# Patient Record
Sex: FEMALE | Race: WHITE | NOT HISPANIC OR LATINO | Employment: PART TIME | URBAN - METROPOLITAN AREA
[De-identification: names, ages, dates, MRNs, and addresses within clinical notes are randomized per-mention and may not be internally consistent; named-entity substitution may affect disease eponyms.]

---

## 2017-01-29 ENCOUNTER — HOSPITAL ENCOUNTER (INPATIENT)
Facility: CLINIC | Age: 22
LOS: 5 days | Discharge: HOME | DRG: 885 | End: 2017-02-03
Attending: FAMILY MEDICINE | Admitting: PSYCHIATRY & NEUROLOGY
Payer: COMMERCIAL

## 2017-01-29 DIAGNOSIS — R45.851 SUICIDAL IDEATION: ICD-10-CM

## 2017-01-29 DIAGNOSIS — F33.2 MDD (MAJOR DEPRESSIVE DISORDER), RECURRENT SEVERE, WITHOUT PSYCHOSIS (H): Primary | ICD-10-CM

## 2017-01-29 LAB
AMPHETAMINES UR QL SCN: NORMAL
BARBITURATES UR QL: NORMAL
BENZODIAZ UR QL: NORMAL
CANNABINOIDS UR QL SCN: NORMAL
COCAINE UR QL: NORMAL
ETHANOL UR QL SCN: NORMAL
HCG UR QL: NEGATIVE
OPIATES UR QL SCN: NORMAL

## 2017-01-29 PROCEDURE — 12400001 ZZH R&B MH UMMC

## 2017-01-29 PROCEDURE — 99285 EMERGENCY DEPT VISIT HI MDM: CPT | Mod: Z6 | Performed by: FAMILY MEDICINE

## 2017-01-29 PROCEDURE — 80307 DRUG TEST PRSMV CHEM ANLYZR: CPT | Performed by: FAMILY MEDICINE

## 2017-01-29 PROCEDURE — 81025 URINE PREGNANCY TEST: CPT | Performed by: FAMILY MEDICINE

## 2017-01-29 PROCEDURE — 25000132 ZZH RX MED GY IP 250 OP 250 PS 637: Performed by: PSYCHIATRY & NEUROLOGY

## 2017-01-29 PROCEDURE — 80320 DRUG SCREEN QUANTALCOHOLS: CPT | Performed by: FAMILY MEDICINE

## 2017-01-29 PROCEDURE — 99285 EMERGENCY DEPT VISIT HI MDM: CPT | Mod: 25 | Performed by: FAMILY MEDICINE

## 2017-01-29 PROCEDURE — 90791 PSYCH DIAGNOSTIC EVALUATION: CPT

## 2017-01-29 RX ORDER — HYDROXYZINE HYDROCHLORIDE 25 MG/1
25-50 TABLET, FILM COATED ORAL EVERY 4 HOURS PRN
Status: DISCONTINUED | OUTPATIENT
Start: 2017-01-29 | End: 2017-02-03 | Stop reason: HOSPADM

## 2017-01-29 RX ORDER — BISACODYL 10 MG
10 SUPPOSITORY, RECTAL RECTAL DAILY PRN
Status: DISCONTINUED | OUTPATIENT
Start: 2017-01-29 | End: 2017-02-03 | Stop reason: HOSPADM

## 2017-01-29 RX ORDER — TRAZODONE HYDROCHLORIDE 50 MG/1
50 TABLET, FILM COATED ORAL
Status: DISCONTINUED | OUTPATIENT
Start: 2017-01-29 | End: 2017-02-03 | Stop reason: HOSPADM

## 2017-01-29 RX ORDER — OLANZAPINE 5 MG/1
5 TABLET ORAL
Status: DISCONTINUED | OUTPATIENT
Start: 2017-01-29 | End: 2017-02-03 | Stop reason: HOSPADM

## 2017-01-29 RX ORDER — OLANZAPINE 10 MG/2ML
5 INJECTION, POWDER, FOR SOLUTION INTRAMUSCULAR
Status: DISCONTINUED | OUTPATIENT
Start: 2017-01-29 | End: 2017-02-03 | Stop reason: HOSPADM

## 2017-01-29 RX ORDER — ALUMINA, MAGNESIA, AND SIMETHICONE 2400; 2400; 240 MG/30ML; MG/30ML; MG/30ML
30 SUSPENSION ORAL EVERY 4 HOURS PRN
Status: DISCONTINUED | OUTPATIENT
Start: 2017-01-29 | End: 2017-02-03 | Stop reason: HOSPADM

## 2017-01-29 RX ORDER — BIOTIN 10 MG
1 TABLET ORAL EVERY MORNING
COMMUNITY
End: 2022-04-11

## 2017-01-29 RX ORDER — ACETAMINOPHEN 325 MG/1
650 TABLET ORAL EVERY 4 HOURS PRN
Status: DISCONTINUED | OUTPATIENT
Start: 2017-01-29 | End: 2017-02-03 | Stop reason: HOSPADM

## 2017-01-29 RX ADMIN — TRAZODONE HYDROCHLORIDE 50 MG: 50 TABLET ORAL at 22:41

## 2017-01-29 NOTE — ED PROVIDER NOTES
History     Chief Complaint   Patient presents with     Depression     pt has long struggled with anxiety and depression, has not sought professional help, has been calling help lines only. pt now having frequent thoughts of suicide but has not made any attempts     Suicidal     HPI  Hana Naegele is a 21 year old female who presents with depression, anxiety, isolating herself, suicidal thoughts.  Patient describes a history of mental health disorder dating back to her aly year in high  school.  She reports treatment with fluoxetine that she found helpful.  There was some question as to whether or not the patient may not be on the autism spectrum but she does not carry that diagnosis.  Due to some very complicated family dynamics she stopped all mental health treatment.  She has been supported financially by her biological father and stepmother.  She has attended Art school, and now is at [employer] due to the previous school closing.  She reports she no longer wants to attend at school, is depressed, anxious, and paranoid and cannot leave her apartment.  She states she has been isolated in the apartment since January 9.  She has frequent suicidal thoughts including thoughts about jumping off a bridge.  No previous suicide attempts but admits that last September she went [place] the [place] and contemplated jumping.  She is somewhat estranged from her biological father, alleging that he would like to monitor all of her emails, texts, school reports.  She feels that he would like more control than is normal for a parent over a 21-year-old adult.  Until recently, she had very little contact with her biological mother and stepfather for the last year and a half but has been reaching out to them because the other side of her family does not support mental health treatment.  She reports that today she called her biological mother and stepfather and told him that she was desperate and needed help.    I have  reviewed the Medications, Allergies, Past Medical and Surgical History, and Social History in the Epic system.    Review of Systems  All other systems reviewed and were negative    Physical Exam   BP: 129/80 mmHg  Pulse: 93  Temp: 98  F (36.7  C)  Resp: 14  Weight: 72.122 kg (159 lb)  SpO2: 98 %  Physical Exam   Constitutional: No distress.   HENT:   Head: Atraumatic.   Mouth/Throat: Oropharynx is clear and moist. No oropharyngeal exudate.   Eyes: Pupils are equal, round, and reactive to light. No scleral icterus.   Cardiovascular: Normal heart sounds and intact distal pulses.    Pulmonary/Chest: Breath sounds normal. No respiratory distress.   Abdominal: Soft. Bowel sounds are normal. There is no tenderness.   Musculoskeletal: She exhibits no edema or tenderness.   Skin: Skin is warm. No rash noted. She is not diaphoretic.   Psychiatric: Her speech is normal and behavior is normal. Judgment normal. Her mood appears anxious. Cognition and memory are normal. She exhibits a depressed mood. She expresses suicidal ideation. She expresses suicidal plans.       ED Course     Procedures             Critical Care time:  none               Labs Ordered and Resulted from Time of ED Arrival Up to the Time of Departure from the ED - No data to display    Assessments & Plan (with Medical Decision Making)   Patient with a history of depression, anxiety, possible features consistent with autism spectrum disorder but no previous diagnosis.  She presents now with depressed mood, severe anxiety, isolating herself, suicidal thoughts with specific consideration of jumping from a bridge.  Patient was also seen by the [facility] .  Please see their extensive associated note for this patient on this visit.  This patient has a very complicated psychosocial support system, and appears currently to be a somewhat high risk for suicide.  We would like to admit for mental health stabilization, and further assessment.  The patient agrees to  voluntary admission.  No medical or substance abuse issues.    I have reviewed the nursing notes.    I have reviewed the findings, diagnosis, plan and need for follow up with the patient.    New Prescriptions    No medications on file       Final diagnoses:   Depression with anxiety   Suicidal ideation       1/29/2017   [facility], EMERGENCY DEPARTMENT      Iglesia Arauz MD  01/29/17 7985

## 2017-01-29 NOTE — PHARMACY-ADMISSION MEDICATION HISTORY
Admission Medication History status for the 1/29/2017 admission is complete.  See EPIC admission navigator for Prior to Admission medications.    Medication history sources:  Patient    Medication history source reliability: Good; patient knew her current medications    Medication adherence:  Patient is currently only taking a multivitamin. She reports she had been taking medication 1.5 years ago, but stopped taking on her own.      Changes made to PTA medication list (reason)  Added:  - Multivitamin 1 chew tab po daily per patient  Deleted: None  Changed: None    Additional medication history information (including reliability of information, actions taken by pharmacist): None    Time spent in this activity: 10 minutes    Medication history completed by: Ann Givens, PharmD    Prior to Admission medications    Medication Sig Last Dose Taking? Auth Provider   Multiple Vitamins-Minerals (MULTIVITAMIN ADULT) CHEW Take 1 chew tab by mouth daily Past Week Yes Unknown, Entered By History

## 2017-01-29 NOTE — IP AVS SNAPSHOT
MRN:[id number]                      After Visit Summary   1/29/2017    Hana Naegele    MRN: [id number]           Patient Information     Date Of Birth          1995        About your hospital stay     You were admitted on:  January 29, 2017 You last received care in the:  [facility]    You were discharged on:  February 3, 2017       Who to Call     For medical emergencies, please call 911.  For non-urgent questions about your medical care, please call your primary care provider or clinic, None          Attending Provider     Provider    Iglesia Arauz MD Pavey, Leonid Rucker MD       Primary Care Provider    Physician No Ref-Primary       No address on file        Your next 10 appointments already scheduled     Feb 07, 2017  2:00 PM   Evaluation with [facility] ([facility])    [address] [id number] [phone]              Further instructions from your care team       Behavioral Discharge Planning and Instructions      Summary: You were admitted on 1/29/2017 to [address] for Depression and Suicidal Ideations. You were treated by Dr. Leonid De La Cruz MD and discharged on 02/03/2017.     Disposition: Discharged to home    Main Diagnosis:  Major Depressive D/O    Health Care Follow-up Appointments:   Medication Management: No appointments were set up as you are looking into various providers and report that you will schedule the appointment on your own.    Therapy Appointment/Referrals:   No Appt has been set up yet as you report that you will be applying for MA (MNSURE when you get back home)+    [facility]  [address] [place] [address]  Phone: [phone]    Day Treatment Appointment:   Date: 02/10/17 (Initial Assessment)  Time: 2:00 PM      Provider: [facility]  "  Address: [address] [place] [address]  Phone:[phone]      Attend all scheduled appointments with your outpatient providers. Call at least 24 hours in advance if you need to reschedule an appointment to ensure continued access to your outpatient providers.   Major Treatments, Procedures and Findings: You were provided with: a psychiatric assessment, assessed for medical stability, medication evaluation and/or management, group therapy, art therapy, milieu management, medical interventions, skills/OT groups and family discharge conference.    Symptoms to Report: If you experience any of the following symptoms please report them right away to your provider or to family/friends; increased confusion, losing more sleep, mood getting worse or thoughts of suicide.    Early warning signs can include: Early warning signs that could signal a potential relapse could include but not limited to the following; increased depression or anxiety sleep disturbances increased thoughts or behaviors of suicide or self-harm .    Safety and Wellness: Take all medicines as directed. Make no changes unless your doctor suggests them.      Follow treatment recommendations. Refrain from alcohol and non-prescribed drugs.  If there is a concern for safety, call 911..    Resources:    Crisis Intervention: 751.991.4335 or 604-043-2998 (TTY: 582.992.7265).  Call anytime for help.  National [place] on Mental Illness (www.mn.lynn.org): 817.574.9122 or 987-219-0632.  National Suicide Prevention Line (www.mentalhealthmn.org): 811-860-FVSR (2581)  [facility] Crisis (COPE) Response - Adult 948 136-5597  Text 4 Life: txt \"LIFE\" to 01737 for immediate support and crisis intervention  Crisis text line: Text \"START\" to 152-057. Free, confidential, 24/7.  Crisis Intervention: 737.467.7618 or 521-631-5192. Call anytime for help.     The treatment team has appreciated the opportunity to work with you. Dany ,  please take " "care and make your recovery a daily recovery. If you have any questions or concerns our unit number is [phone].  You will be receiving a follow-up phone call within the next three days from a representative from behavioral health.  You have identified the best phone number to reach you as [phone].          Pending Results     No orders found from 2017 to 2017.            Admission Information        Provider Department Dept Phone    2017 Leonid De La Cruz MD [facility]  [phone]      Your Vitals Were     Blood Pressure Pulse Temperature Respirations Weight Pulse Oximetry    115/72 mmHg 80 98.3  F (36.8  C) (Oral) 16 70.398 kg (155 lb 3.2 oz) 96%      MyChart Information     [email or web address] lets you send messages to your doctor, view your test results, renew your prescriptions, schedule appointments and more. To sign up, go to [email or web address] . Click on \"Log in\" on the left side of the screen, which will take you to the Welcome page. Then click on \"Sign up Now\" on the right side of the page.     You will be asked to enter the access code listed below, as well as some personal information. Please follow the directions to create your username and password.     Your access code is: [email or web address]  Expires: 2017  8:44 AM     Your access code will  in 90 days. If you need help or a new code, please call your [facility] clinic or [phone].        Care EveryWhere ID     This is your Care EveryWhere ID. This could be used by other organizations to access your [facility] medical records  [id number]           Review of your medicines      START taking        Dose / Directions    FLUoxetine 10 MG capsule   Commonly known as:  PROzac   Used for:  MDD (major depressive disorder), recurrent severe, without psychosis (H)        Dose:  10 mg   Take 1 capsule (10 mg) by mouth daily   Quantity:  30 capsule   Refills:  1         CONTINUE these medicines which have NOT CHANGED        " Dose / Directions    MULTIVITAMIN ADULT Chew        Dose:  1 chew tab   Take 1 chew tab by mouth daily   Refills:  0            Where to get your medicines      These medications were sent to [facility] [place], [place] - [address]  [address] [facility], [facility] [address]     Phone:  [phone]    - FLUoxetine 10 MG capsule             Protect others around you: Learn how to safely use, store and throw away your medicines at www.disposemymeds.org.             Medication List: This is a list of all your medications and when to take them. Check marks below indicate your daily home schedule. Keep this list as a reference.      Medications           Morning Afternoon Evening Bedtime As Needed    FLUoxetine 10 MG capsule   Commonly known as:  PROzac   Take 1 capsule (10 mg) by mouth daily   Last time this was given:  10 mg on 2/3/2017  8:21 AM                                MULTIVITAMIN ADULT Chew   Take 1 chew tab by mouth daily

## 2017-01-29 NOTE — IP AVS SNAPSHOT
[facility]    [facility] [address]    [address] [facility] [address]    Phone:  [phone]                                       After Visit Summary   1/29/2017    Hana Naegele    MRN: [id number]           After Visit Summary Signature Page     I have received my discharge instructions, and my questions have been answered. I have discussed any challenges I see with this plan with the nurse or doctor.    ..........................................................................................................................................  Patient/Patient Representative Signature      ..........................................................................................................................................  Patient Representative Print Name and Relationship to Patient    ..................................................               ................................................  Date                                            Time    ..........................................................................................................................................  Reviewed by Signature/Title    ...................................................              ..............................................  Date                                                            Time

## 2017-01-30 PROBLEM — F41.8 DEPRESSION WITH ANXIETY: Status: ACTIVE | Noted: 2017-01-30

## 2017-01-30 PROBLEM — F33.2 MDD (MAJOR DEPRESSIVE DISORDER), RECURRENT SEVERE, WITHOUT PSYCHOSIS (H): Status: ACTIVE | Noted: 2017-01-30

## 2017-01-30 PROBLEM — R45.851 SUICIDAL IDEATION: Status: ACTIVE | Noted: 2017-01-30

## 2017-01-30 LAB
ANION GAP SERPL CALCULATED.3IONS-SCNC: 8 MMOL/L (ref 3–14)
BASOPHILS # BLD AUTO: 0 10E9/L (ref 0–0.2)
BASOPHILS NFR BLD AUTO: 0.3 %
BUN SERPL-MCNC: 9 MG/DL (ref 7–30)
CALCIUM SERPL-MCNC: 8.8 MG/DL (ref 8.5–10.1)
CHLORIDE SERPL-SCNC: 109 MMOL/L (ref 94–109)
CHOLEST SERPL-MCNC: 113 MG/DL
CO2 SERPL-SCNC: 25 MMOL/L (ref 20–32)
CREAT SERPL-MCNC: 0.69 MG/DL (ref 0.52–1.04)
DEPRECATED CALCIDIOL+CALCIFEROL SERPL-MC: 20 UG/L (ref 20–75)
DIFFERENTIAL METHOD BLD: NORMAL
EOSINOPHIL # BLD AUTO: 0.3 10E9/L (ref 0–0.7)
EOSINOPHIL NFR BLD AUTO: 4.5 %
ERYTHROCYTE [DISTWIDTH] IN BLOOD BY AUTOMATED COUNT: 13.3 % (ref 10–15)
GFR SERPL CREATININE-BSD FRML MDRD: NORMAL ML/MIN/1.7M2
GLUCOSE SERPL-MCNC: 79 MG/DL (ref 70–99)
HCT VFR BLD AUTO: 39 % (ref 35–47)
HDLC SERPL-MCNC: 33 MG/DL
HGB BLD-MCNC: 14.2 G/DL (ref 11.7–15.7)
IMM GRANULOCYTES # BLD: 0 10E9/L (ref 0–0.4)
IMM GRANULOCYTES NFR BLD: 0.3 %
LDLC SERPL CALC-MCNC: 66 MG/DL
LYMPHOCYTES # BLD AUTO: 3 10E9/L (ref 0.8–5.3)
LYMPHOCYTES NFR BLD AUTO: 42.7 %
MCH RBC QN AUTO: 32.9 PG (ref 26.5–33)
MCHC RBC AUTO-ENTMCNC: 36.4 G/DL (ref 31.5–36.5)
MCV RBC AUTO: 90 FL (ref 78–100)
MONOCYTES # BLD AUTO: 0.5 10E9/L (ref 0–1.3)
MONOCYTES NFR BLD AUTO: 6.8 %
NEUTROPHILS # BLD AUTO: 3.2 10E9/L (ref 1.6–8.3)
NEUTROPHILS NFR BLD AUTO: 45.4 %
NONHDLC SERPL-MCNC: 80 MG/DL
NRBC # BLD AUTO: 0 10*3/UL
NRBC BLD AUTO-RTO: 0 /100
PLATELET # BLD AUTO: 204 10E9/L (ref 150–450)
POTASSIUM SERPL-SCNC: 3.6 MMOL/L (ref 3.4–5.3)
RBC # BLD AUTO: 4.32 10E12/L (ref 3.8–5.2)
SODIUM SERPL-SCNC: 142 MMOL/L (ref 133–144)
TRIGL SERPL-MCNC: 69 MG/DL
TSH SERPL DL<=0.005 MIU/L-ACNC: 1.77 MU/L (ref 0.4–4)
WBC # BLD AUTO: 6.9 10E9/L (ref 4–11)

## 2017-01-30 PROCEDURE — 25000132 ZZH RX MED GY IP 250 OP 250 PS 637: Performed by: PSYCHIATRY & NEUROLOGY

## 2017-01-30 PROCEDURE — 36415 COLL VENOUS BLD VENIPUNCTURE: CPT | Performed by: PSYCHIATRY & NEUROLOGY

## 2017-01-30 PROCEDURE — 12400001 ZZH R&B MH UMMC

## 2017-01-30 PROCEDURE — H2032 ACTIVITY THERAPY, PER 15 MIN: HCPCS

## 2017-01-30 PROCEDURE — 99222 1ST HOSP IP/OBS MODERATE 55: CPT | Mod: AI | Performed by: PSYCHIATRY & NEUROLOGY

## 2017-01-30 PROCEDURE — 82306 VITAMIN D 25 HYDROXY: CPT | Performed by: PSYCHIATRY & NEUROLOGY

## 2017-01-30 PROCEDURE — 84443 ASSAY THYROID STIM HORMONE: CPT | Performed by: PSYCHIATRY & NEUROLOGY

## 2017-01-30 PROCEDURE — 80048 BASIC METABOLIC PNL TOTAL CA: CPT | Performed by: PSYCHIATRY & NEUROLOGY

## 2017-01-30 PROCEDURE — 80061 LIPID PANEL: CPT | Performed by: PSYCHIATRY & NEUROLOGY

## 2017-01-30 PROCEDURE — 85025 COMPLETE CBC W/AUTO DIFF WBC: CPT | Performed by: PSYCHIATRY & NEUROLOGY

## 2017-01-30 RX ADMIN — HYDROXYZINE HYDROCHLORIDE 25 MG: 25 TABLET ORAL at 19:47

## 2017-01-30 ASSESSMENT — ACTIVITIES OF DAILY LIVING (ADL)
GROOMING: INDEPENDENT
GROOMING: INDEPENDENT
LAUNDRY: WITH SUPERVISION
ORAL_HYGIENE: INDEPENDENT
ORAL_HYGIENE: INDEPENDENT
DRESS: INDEPENDENT
DRESS: INDEPENDENT
LAUNDRY: UNABLE TO COMPLETE

## 2017-01-30 NOTE — PROGRESS NOTES
01/30/17 1600   Art Therapy   Type of Intervention structured groups   Response participates with encouragement   Hours 3   Groups for Art Therapy included 1. Group intervention called the carosel about team work and peer support, then there was a free drawing hour. After a music group, haider was tired and didn't want to participate in meditation today. For the few participants there were, they listened to a video from LEANDRA Avilez about Self Compassion. This patient was cooperative and pleasant. Pt was helpful to a male peer and helped him design a coloring sheet for his project, he liked her drawing style. She is pursuing a degree in Signature.

## 2017-01-30 NOTE — PROGRESS NOTES
Case Management Note  1/30/2017    CTC met with patient to initiate discharge planning and to do initial psych-social assessment. Initial psych-social note to follow.

## 2017-01-30 NOTE — H&P
"DATE OF ADMISSION:  01/29/2017.       DATE OF SERVICE:  01/30/2017.       CHIEF COMPLAINT:  \"I have been feeling suicidal.\"      HISTORY OF PRESENT ILLNESS:  Hana Naegele is a 21-year-old woman who reports that she has been increasingly depressed over the last year.  She has had suicidal thoughts and does feel she can keep herself safe.  She indicates that her depression really started to worsen when she found out that her school was closing.  She was going to the [employer] in [place].  She left because her choices were either to stay on and not be covered under any sort of federal loan thing or go to the [employer], so she elected to go there instead.  She said that this caused her to lose contact with a number of friends at the school.  In addition, she had some friends who had planned to go to that school and she was going to live with them so she lost her roommates.  She has also struggled with finding some roommates as she has moved around a few times.  She indicates that her grandparents on her father's side pay for her tuition but she feels that there is a lot strings with this.  They are very inconsistent on whether or not they want her to work or focus on school, and she feels that they are overly intrusive in the way they monitor how she is doing.  She has been told since November of last year that she needs to give her user name and password so they can log in and see all her verifications of classroom attendance.  She is frustrated about this because that means they would also have access to emails, any communication with professors and basically a lot of potentially personal things.  They are unwilling to pay for tuition on her behalf without these things.  In addition, they have been trying to get her a new cell phone and she feels that they want to try and track her with her cell phone.  She says she does have some more paranoid thoughts at times but recognizes " they are paranoid and does not think that they are real.  These worsen with her depression.  Apparently in September she had contemplated jumping off of the [employer] [place] in a suicide attempt after she had a fight with some friends; however, she changed her mind after recalling a previous conversation that day with another friend.  She has never had an actual suicide attempt.  She does not feel suicidal today, but was having some suicidal thoughts last night.  She does have a history of being on Prozac; however, she is not interested on being on a medication at this time as she does not want to become dependent on them.  She is interested in seeing a therapist outside the hospital.  The patient does hint at some other issues that she says she has some gender dysphoria.  She talks about some interests of hers in the past and intentionally referred to that person in gender neutral terms multiple times.  She is resistant to discussing these issues at this time.  She reports that her sleep is good.  Her appetite is good.  She says her daytime energy and motivation are reasonable.  She listens to music.  She likes to draw.  She also plays video games throughout the day.  She says she does not leave the house, however.       PAST PSYCHIATRIC HISTORY:  The patient denies any past inpatient hospitalizations.  She does not currently have an outpatient psychiatrist or therapist.  Suicide history noted in HPI.      PAST MEDICAL HISTORY:  The patient denies any chronic or acute medical issues.      SUBSTANCE HISTORY:  The patient denies any tobacco, alcohol or illicit drug use.      PHYSICAL REVIEW OF SYSTEMS:  The patient denies problems on 10-point review of systems except as noted in HPI.      FAMILY HISTORY:  The patient denies any family suicide.  She said that her mother has problems with anxiety and she wonders if mother had problems with prescription drugs as when she and her sister would get any sort of pain  prescription or something, her mother would fill them and take half of them even if she indicated that her pain was not bad enough to fill in the first place.      SOCIAL HISTORY:  The patient currently lives with a roommate who is attending college at the [employer].  She is attending college at [employer]; however, she has not attended any days this semester.  This semester started on 01/09.  Other relevant social history as noted in HPI.      ALLERGIES:  The patient is allergic to penicillin.      PRIOR TO ADMISSION MEDICATIONS:  None.      LABORATORY DATA:  Basic metabolic panel within normal limits.  Lipid panel notable for low HDL of 33, otherwise within normal limits.  TSH 1.77.  Urine pregnancy was negative.  CBC with differential is within normal limits.  Urine toxicology is negative for all drugs of abuse.      VITAL SIGNS:  Temperature is 98.4, pulse 79, respiratory rate is 16, blood pressure is 115/72, oxygen saturations 96% on room air.      PHYSICAL EXAMINATION:  I have reviewed physical exam as documented by emergency room physician, Iglesia Arauz, dated 01/29/2017.  I have no additional findings at this time.      MENTAL STATUS EXAMINATION:  The patient is alert.  She is oriented to person, place and date.  She is somewhat unkempt but she is casually dressed.  She has good eye contact.  She is cooperative throughout the interview.  Speech is normal in rate and volume.  Language is intact for word usage and sentence structure.  Mood is euthymic, somewhat down.  Affect is congruent to mood.  She is somewhat guarded.  Thought process is linear for the most part.  She tells very detailed stories, but does stay on track.  Associations are intact.  Thought content is currently negative for suicidal thoughts; however, she was having suicidal thoughts last night.  She denies any current hallucinations.  Does not appear to be responding to internal stimuli.  She  has no psychomotor agitation or retardation.  Muscle strength and tone and gait and station are within normal limits.  Recent and remote memory are intact.  Fund of knowledge is adequate.  Attention and concentration are intact.  Insight is fair, judgment is fair.      DIAGNOSIS:   1.  Major depressive disorder, recurrent, severe without psychotic features.      PLAN:   1.  The patient is not agreeable to starting medication at this time.   2.  CTC to work with patient on referral for outpatient therapy.   3.  Legal:  The patient is currently here voluntarily.   4.  Disposition:  Anticipate discharge tomorrow or Wednesday if she remains free from suicidal thinking.  Will stay in the hospital until we have outpatient followup established.         JOSEPHINE ZUNIGA MD             D: 2017 15:06   T: 2017 16:48   MT: SHER      Name:     NAEGELE, HANA   MRN:      [id number]        Account:      [id number]   :      1995           Admitted:     [id number]      Document: [id number]

## 2017-01-30 NOTE — PROGRESS NOTES
Patient is admitted to station 4A from the [facility].  Patient is searched and given a tour of the unit.  Patient spoke with mom.  A meal tray is ordered.  After the patient has eaten, the patient and writer will sit down and complete the admission.

## 2017-01-30 NOTE — PROGRESS NOTES
"Initial Psychosocial Assessment    Information for assessment was obtained from: I have reviewed the chart and interviewed the patient.      NAFISA's: The patient has signed NAFISA's for her mother \"Ana\".     Presenting Problem/Precipitory Event: Treatment due to own awareness of need for help. Per EMR:  Pt \"has been increasingly depressed over the last year.  She has had suicidal thoughts and does feel she can keep herself safe.  She indicates that her depression really started to worsen when she found out that her school was closing.  She was going to the [email or web address] in [place].  She left because her choices were either to stay on and not be covered under any sort of federal loan thing or go to the [place] [employer], so she elected to go there instead.\"  Legal Status: The patient admitted under voluntary status.  History of Mental Health and Chemical Dependency:  Diagnosis: Per EMR; Major Depressive D/O.  Current symptoms: Pt reports having the following  symptomology; isolating, lack of energy, poor sleep, loss of interest, low mood, trouble concentrating and racing thoughts.  Previous hospitalizations: No.  MH treatment history: Yes (explain) - Reports seeing a therapist as an adolescent..    Commitments (Current or Previous): No.    Hx of suicidal attempts: No.  SIB's: No.  Suicidal thoughts and plan: Yes (explain) - Reports she had SI's and had a plan of jumping off a bridge pre hospital admission. Pt denies having any current SI's or plans.   Homicidal Ideation and History of Aggression: No.     Substance Use/Abuse History:    At time of admission, the patient s drug screen was negative for all substances tested.    History of Chemical Dependency: No.  Family Description (Constellation, Family Psychiatric History):    Pt reports her childhood was \"very difficult\" and that they felt supported 27% of the time growing up.   How many siblings? 1-sister.  Birth order: first  Pt reports " "that her father was adopted by pt's grandparents as a kid. Pt reports that parents split when she was younger than 7 years old and stayed with her mother since. Pt reports that her mother got a lot of money for child support but pt still had to live in the \"3rd garage stall\" due to there not being an available bedroom. Pt reports that she didn't talk with her dad for several years and that he comes from a \"very, very wealthy family\". Pt reports that she came out at the age of 13 that she was a boy but due to family pressures has been unable to pursue a gender change.   Are you close to any of your family members/natural supports? Yes, pt reports only being close to her mother but nobody else in the family.  Current relationship (s): Single, in no serious relationship.  Children: No children.    Family MH and/or CD History: Yes (Explain) - Family MH History: Mother takes anti-axiety medications.. Family CD History: Mother used to fill and take both pt's sister and the pt's prescriptions when younger. Pt doesn't think that her mother has any current CD issues...   Significant Life Events (Illness, Abuse, Trauma, Death):  Yes (explain) - Pt reports her grandmothers severe illness and that her grandmother is looking into assisted suicide. Pt also reports that her struggles with family regarding her gender identity has also been traumatic for her..    Medical History (Per EMR):   No past medical history on file.  Note: See medical records for complete medical history.   Living Situation: Pt reports that they; have stable housing and has some concerns about if her grandparents will not continue to pay for her apartment due to not being an active student.  Educational Background:   Highest grade of school completed: Pt reports that they have some college (1+ years), but have no degree.    Occupational History:   Pt reports that they are unemployed mainly due to being a student.  Financial Status: Strained  Sources of " Income (i.e. social security, alimony, [place], employed or other): Allowance from grandparents.  Transportation (i.e. drives, public transport, medical ride): Takes public transportation or rides from others due to only having her permit.  Type of insurance: Payor: BCBS / Plan: BCBS OUT OF STATE / Product Type: [employer] /  [id number]    Legal Issues:  No.  Ethnic/Cultural Issues:  Yes (explain) - Pt reports her gender identity is a cultural issue for her that she is sturggling with and would like to go ahead with the gender re-assignment process..  Spiritual Orientation: Pt reports they are No affiliations.   Service History:  No.  Social Functioning (organization, interests):  Pt reports she enjoys, art and marine biology. Pt reports also having the following; Minimal support network, Tendency to isolate from others, Relationship conflict with family members or friends due to her mental health concerns, No history of legal charges, Unemployed, Decreased performance at work or school in part due to her mental health concerns and Financial stressors.  Current Treatment Providers:  Psychiatrist: None currently but in the past had one.  Therapist: None  Primary Care Provider: None    Social Service Assessment/Plan:  Patient has been admitted to the psychiatric unit for stabilization. Patient will be seen and assessed by psychiatric doctor. Medication changes will be reviewed and monitored. Groups will be offered to assist patient with increasing knowledge, strategies, and copping techniques to help manage mental health. CTC will meet with patient to provide individualized mental health resources and support throughout patient s hospitalization. CTC will assist with developing a Care Plan as well as aftercare appointments. CTC recommended pt [facility] at [facility] the [facility] regarding therapy or to look at [facility] for both therapist and possible DBT programming.  Pt reports wanting to obtain and appointment through the [facility] at this time and declines wanting a medication provider at this time.

## 2017-01-30 NOTE — PROGRESS NOTES
01/29/17 1808   Patient Belongings   Did you bring any home meds/supplements to the hospital?  No   Patient Belongings cell phone/electronics;clothing;keys;wallet;shoes;purse;money (see comment)   Disposition of Belongings in patient's bin   Belongings Search Yes   Clothing Search Yes   Second Staff Abby     In patient's bin:     Cellphone, , keys, sunglasses, black jump drive, purse, wallet, jacket, shoes    Envelope sent to security: #[id number] (1 student id, [place] card, [place], 2 mastercards, $21.00)    Additional items brought by mother:   1 red fleece;  1 pair blue jeans;   1 green tshirt;  1 black tshirt;  1 gray sweatshirt;  1 pair black pants;  1 gray tshirt;  1 striped tank top;  1 pair slippers;   4 pairs underwear.        ADMISSION:  I am responsible for any personal items that are not sent to the safe or pharmacy. [facility] is not responsible for loss, theft or damage of any property in my possession.    Patient Signature _____________________ Date/Time _____________________    Staff Signature _______________________ Date/Time _____________________    2nd Staff person, if patient is unable/unwilling to sign  ___________________________________ Date/Time _____________________    DISCHARGE:  My personal items have been returned to me.   Patient Signature _____________________ Date/Time _____________________
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
## 2017-01-30 NOTE — PROGRESS NOTES
Pt attended Art Therapy, but not focus group. Wants outpatient treatment. Admits/accepts condition. Pt pleasant upon approach, social in milieu. Pt denies SI/SIB. Says her depression has been worse than it was today, has been working to become more comfortable here. Says her room is difficult to be in.     01/30/17 1400   Behavioral Health   Thinking intact   Orientation person: oriented;place: oriented;date: oriented;time: oriented   Memory baseline memory   Insight admits / accepts   Judgement impaired   Eye Contact at examiner   Affect blunted, flat   Mood depressed;mood is calm   Physical Appearance/Attire attire appropriate to age and situation   Hygiene well groomed   Suicidality other (see comments)  (denies)   Self Injury other (see comment)  (denies)   Activity isolative   Speech coherent;clear   Medication Sensitivity no stated side effects;no observed side effects   Psychomotor / Gait balanced;steady   Psycho Education   Type of Intervention 1:1 intervention   Response participates, initiates socially appropriate   Hours 0.5   Treatment Detail Triggers   Activities of Daily Living   Hygiene/Grooming independent   Oral Hygiene independent   Dress independent   Laundry unable to complete   Room Organization independent   Activity   Activity Level of Assistance independent   Behavioral Health Interventions   Depression maintain safety precautions;maintain safe secure environment;assist with developing and utilizing healthy coping strategies;build upon strengths   Social and Therapeutic Interventions (Depression) encourage socialization with peers;encourage effective boundaries with peers;encourage participation in therapeutic groups and milieu activities

## 2017-01-31 PROCEDURE — 90853 GROUP PSYCHOTHERAPY: CPT

## 2017-01-31 PROCEDURE — 12400001 ZZH R&B MH UMMC

## 2017-01-31 PROCEDURE — 99232 SBSQ HOSP IP/OBS MODERATE 35: CPT | Performed by: PSYCHIATRY & NEUROLOGY

## 2017-01-31 PROCEDURE — 97150 GROUP THERAPEUTIC PROCEDURES: CPT | Mod: GO

## 2017-01-31 ASSESSMENT — ACTIVITIES OF DAILY LIVING (ADL)
ORAL_HYGIENE: INDEPENDENT
ORAL_HYGIENE: INDEPENDENT
GROOMING: INDEPENDENT
DRESS: STREET CLOTHES
DRESS: INDEPENDENT
GROOMING: INDEPENDENT

## 2017-01-31 NOTE — PROGRESS NOTES
"Writer receives phone call from pt's biological mother Ana, who visit pt last evening. Caller states pt was extremely anxious and paranoid, stating \"my father is going to kill me and be very upset that I am here and I am nervous he and his wife, my step mother, will just show up or something\". Patient is still considering medications.    Mom called with concern for daughter. She would like patient to start taking prozac because it has helped her in the past. Her sister takes it and it helps her too. Caller also concerned for Dad & step mother calling and imposing themselves as her just to obtain information. Mom and step mom have done that in past to obtain information on other children. Mom wishes to protect her daughter from having to confront her father at this time. MotherAna can be reached at [phone] Caller given reassurance of patients privacy     Discussed the option of being restricted from directory with pt. Patient would like others to know she is in hospital, and expects to receive calls from her friends. Pt would like to only receive incoming calls from mother Ana. Patient, Mother and Nursing unit have established a code word to protect patient and ensure identity of callers that might be calling pt.  Patient also states a greater desire to begin an antidepressant. She was encouraged to speak to her provider about this today. Positive reinforcement given for her ability to set boundaries and make decision to begin antidepressant.      "

## 2017-01-31 NOTE — PLAN OF CARE
Problem: General Plan of Care (Inpatient Behavioral)  Goal: Team Discussion  Team Plan:   Outcome: No Change  Behavioral Team Discussion: (1/30/2017)    Continued Stay Criteria/Rationale: Patient admitted for Depression and Suicidal Ideations.  Plan: Psychiatric Assessment. Medication Management. Therapeutic Mileu. Individual and group support.  Participants: 4A Provider: Dr. Leonid De La Cruz MD; 4A RN's: Zandra Cifuentes, RN and Marivel Han, RN; 4A CTC's: Alex Jarquin (CTC), Raiza Matias (CTC), Seb Sinha (Art Therapist) and KELVIN Doss (Clinical ).  Summary/Recommendation: Patient admitted due to worsening symptoms of Depression and Suicidal Ideations. Providers will continue to assess today and finalize discharge plan.  Medical/Physical: Deferred (see medical notes).  Progress: No change.

## 2017-01-31 NOTE — PROGRESS NOTES
Pt was present and social in the milieu. Pt attended all groups today and was an active member. Pt had a blunt affect but despite this was social with others. Pt reported having anxiety and depression today both at 5/10. Pt reported having trouble concentrating. Pt reported having auditory hallucinations but seemed to have worked it out to be a product of some racing thoughts. Pt did not seemed concerned about the hallucinations.

## 2017-01-31 NOTE — PROGRESS NOTES
01/31/17 1600   Occupational Therapy   Type of Intervention structured groups   Response Participates   Hours 2.5   Pt. Attended 2 of 3 scheduled OT sessions today.   Observations: pt participated in discussion and group activity with a positive affect and pleasant attitude.   Group Description:   Pt attended and participated in a structured occupational therapy group session where intervention focused on creating a visual cue card for managing time with activities available on the Unit. Pts identified activities as a group and created a group poster with activities that are social vs. individual and were educated on Unit resources.   Pt participated in OT clinic, working on completing a self-initiated project facilitated by OT and graded to appropriate functional performance.

## 2017-01-31 NOTE — PROGRESS NOTES
Writer and patient mother spoke prior to visit per mothers request.  Patient mother asked how she could assist the patient as the patient was focused on her fear and anxiety that she experiences with thoughts of talking to her father and grandfather regarding her current medical and school status. Parent is encouraged to speak with  and encourage patient to focus on herself and take one day at a time.

## 2017-01-31 NOTE — PROGRESS NOTES
[facility] [place]   Psychiatric Progress Note        Interim History:   The patient's care was discussed with the treatment team during the daily team meeting and/or staff's chart notes were reviewed.  Staff report patient has been attending groups and socializing. She apparently indicated she will consider medication.    When I met with the patient, she indicated that she was not interested in medications. She did discuss her gender issues in more detail. She states that she has been asking to be called by a male name since she was 13. She states that she came out as a lesbian and later as transgender officially in high school. She states however that she is attracted to both women and men at times. She feels that a therapist experienced in these issues would be helpful as she believes her family has been unwilling to support her in the way that she needs. Patient indicates that she spoke with her mother, who was supportive yesterday. She still hasn't called to talk with her father about stopping school for the semester.         Medications:           Allergies:     Allergies   Allergen Reactions     Penicillins Anaphylaxis          Labs:   No results found for this or any previous visit (from the past 24 hour(s)).       Psychiatric Examination:   /70 mmHg  Pulse 74  Temp(Src) 98.3  F (36.8  C) (Oral)  Resp 16  Wt 70.943 kg (156 lb 6.4 oz)  SpO2 96%  Weight is 156 lbs 6.4 oz  There is no height on file to calculate BMI.    Appearance: awake, alert, adequately groomed and casually dressed  Attitude:  cooperative  Eye Contact:  good  Mood:  better  Affect:  mood congruent  Speech:  clear, coherent and normal prosody   Language: Intact  Psychomotor Behavior & Gait:  no evidence of tardive dyskinesia, dystonia, or tics and intact station, gait and muscle tone  Throught Process:  linear and goal oriented  Associations:  no loose associations  Thought Content:  no evidence of  suicidal ideation or homicidal ideation and no evidence of psychotic thought  Insight:  fair  Judgement:  intact  Oriented to:  time, person, and place  Attention Span and Concentration:  intact  Recent and Remote Memory:  intact   Fund of Knowledge: Adequate         Precautions:     Behavioral Orders   Procedures     Code 1 - Restrict to Unit     Routine Programming     As clinically indicated     Status 15     Every 15 minutes.     Suicide precautions          Diagnoses:   1.  Major depressive disorder, recurrent, severe without psychotic features.    2.  Gender dysphoria         Plan:   1. Continue without medication at patient request  2. Patient to work with CTC on referral to therapist for mental health and gender issues    Legal: Voluntary    Disposition: The patient indicates that her goal is to be ready for Wed or Thursday. Will continue to work on follow-up care to be ready for that.

## 2017-01-31 NOTE — PLAN OF CARE
"Problem: General Plan of Care (Inpatient Behavioral)  Goal: Individualization/Patient Specific Goal (IP Behavioral)  The patient and/or their representative will achieve their patient-specific goals related to the plan of care.    The patient-specific goals include:    Illness Management Recovery model: Objectives  Patient will identify reason(s) for hospitalization from their perspective.  Patient will identify a minimum of three goals for discharge.  Patient will identify a minimum of three triggers that may increase their symptoms.  Patient will identify a minimum of three coping skills they can do to stay well.   Patient will identify their support system to demonstrate readiness for discharge.    Illness Management & Recovery assists patient to develop relapse prevention as  patient identifies triggers for relapse.  patient identifies a general wellness strategy.  patient identifies the warning signs that they are in danger of relapse.  patient identifies someone they count on to get feedback .  patient identifies ways to take action when in danger of relapse.  patient identifies way to cope with stress or other symptoms.   patient participates in self-reflection.  Outcome: No Change  The patient and/or their representative will achieve their patient-specific goals related to the plan of care.    The patient-specific goals include:       \"Reasons you are in the hospital;\" The patient identifies the following reasons for current hospitalization:   1) Suicidal Ideations  2) \"Emotional Low\"    \"Goals for Discharge\" The patient identifies the following goals for discharge:  1) Professional opinion  2) Outpatient MH Services (Therapist)  3) Possibly medications        "

## 2017-01-31 NOTE — PROGRESS NOTES
01/30/17 2226   Behavioral Health   Hallucinations denies / not responding to hallucinations   Thinking other (see comment)  (fair)   Orientation person: oriented;date: oriented;place: oriented;time: oriented   Memory baseline memory   Insight poor   Judgement impaired   Eye Contact at examiner   Affect blunted, flat   Mood depressed;anxious   Physical Appearance/Attire attire appropriate to age and situation   Hygiene well groomed   Suicidality other (see comments)  (denies)   Self Injury urges   Activity isolative   Speech coherent;clear   Psychomotor / Gait balanced   Psycho Education   Type of Intervention 1:1 intervention   Response participates with encouragement   Hours 0.5   Treatment Detail relaxation   Activities of Daily Living   Hygiene/Grooming independent   Oral Hygiene independent   Dress independent   Laundry with supervision   Room Organization independent     Patient spent most of the evening asleep in room, did awake for dinner, isolative, did not attend evening group but did attend relaxation group. Patient checked in with staff, daily goal was to talk to Doctor about outpatient in which she met, mentioned that her depression is at a 6, anxiety a 7. No SI, self injury urges only. Patient states that she picks the bumps on her face but not sure  If its her anxiety or urge to self harm.

## 2017-02-01 PROCEDURE — 99232 SBSQ HOSP IP/OBS MODERATE 35: CPT | Performed by: PSYCHIATRY & NEUROLOGY

## 2017-02-01 PROCEDURE — 25000132 ZZH RX MED GY IP 250 OP 250 PS 637: Performed by: PSYCHIATRY & NEUROLOGY

## 2017-02-01 PROCEDURE — H2032 ACTIVITY THERAPY, PER 15 MIN: HCPCS

## 2017-02-01 PROCEDURE — 12400001 ZZH R&B MH UMMC

## 2017-02-01 RX ORDER — FLUOXETINE 10 MG/1
10 CAPSULE ORAL DAILY
Status: DISCONTINUED | OUTPATIENT
Start: 2017-02-01 | End: 2017-02-03 | Stop reason: HOSPADM

## 2017-02-01 RX ADMIN — FLUOXETINE 10 MG: 10 CAPSULE ORAL at 14:26

## 2017-02-01 ASSESSMENT — ACTIVITIES OF DAILY LIVING (ADL)
GROOMING: INDEPENDENT
DRESS: INDEPENDENT
ORAL_HYGIENE: INDEPENDENT

## 2017-02-01 NOTE — PROGRESS NOTES
Mother came to visit the patient.  After their conversation, the patient and mother asked to speak with writer.  They shared there concerns regarding patient discharge.  The mother reported the patient would have to discharge can not return to her house to live due to family problems.  The patient will have to discharge to her apartment.  The patient was concerned this could delay her discharge.  Patient is encouraged to focus on today and discuss her concerns with her CTC.  Patient verbalize 1) She does not feel safe if she returns to her apartment. 2). She wants to discuss her situation with her father to ensure that she will not get kicked out of her apartment.  She wants someone to sit with her while she makes that phone call.  3). Patient is considering medications.  Patient mom encourages patient to take an antidepressant.  4).  Patient continues to have suicidal thoughts.  5).  Patient is unhappy that she is here in the hospital.  Patient and mother are encouraged to discuss there concerns and needs with their CTC.

## 2017-02-01 NOTE — PROGRESS NOTES
[facility], [place]   Psychiatric Progress Note        Interim History:   The patient's care was discussed with the treatment team during the daily team meeting and/or staff's chart notes were reviewed.  Staff reports that the patient had some difficulty and voiced concerns with being at home and maintaining safety.    Patient reports that after speaking with her mother, she learned that she couldn't stay there. She has yet to call her father to tell him she doesn't plan to return to school this semester. After discussion of risks and benefits, the patient elects to trial of fluoxetine.         Medications:       FLUoxetine  10 mg Oral Daily          Allergies:     Allergies   Allergen Reactions     Penicillins Anaphylaxis          Labs:   No results found for this or any previous visit (from the past 24 hour(s)).       Psychiatric Examination:   /72 mmHg  Pulse 85  Temp(Src) 98  F (36.7  C) (Oral)  Resp 16  Wt 70.943 kg (156 lb 6.4 oz)  SpO2 96%  Weight is 156 lbs 6.4 oz  There is no height on file to calculate BMI.    Appearance: awake, alert, adequately groomed and casually dressed  Attitude:  cooperative  Eye Contact:  good  Mood:  anxious  Affect:  mood congruent  Speech:  clear, coherent and normal prosody   Language: Intact  Psychomotor Behavior & Gait:  no evidence of tardive dyskinesia, dystonia, or tics and intact station, gait and muscle tone  Throught Process:  linear and goal oriented  Associations:  no loose associations  Thought Content:  no evidence of suicidal ideation or homicidal ideation and no evidence of psychotic thought  Insight:  fair  Judgement:  intact  Oriented to:  time, person, and place  Attention Span and Concentration:  intact  Recent and Remote Memory:  intact   Fund of Knowledge: Adequate         Precautions:     Behavioral Orders   Procedures     Code 1 - Restrict to Unit     Routine Programming     As clinically indicated     Status 15      Every 15 minutes.     Suicide precautions          Diagnoses:   1.  Major depressive disorder, recurrent, severe without psychotic features.    2.  Gender dysphoria         Plan:   1. Start trial of fluoxetine 10 mg daily  2. Patient to work with [facility] on referral to therapist for mental health and gender issues    Legal: Voluntary    Disposition: Goal for discharge on Friday.

## 2017-02-01 NOTE — PLAN OF CARE
Problem: Depressive Symptoms  Goal: Depressive Symptoms  Signs and symptoms of listed problems will be absent or manageable.    Patient, prior to discharge, will:  -verbalize decrease in depressive signs/symptoms  -verbalize a decrease in anxiety   -verbalize an understanding of medication regimen   -verbalize absence of SI/SIB   -develop a safety plan  -identify a support system   -will participate in coordination of discharge planning    To promote safety/ mental health    Patient identified the following   Triggers:    Wellness Strategies:    1. Relaxation.  2. Art activities for distraction.  3. Self respect.    Warning Signs:      Feedback (people they would like to receive feedback from if early warning signs):  Friend(s)    Family(s):     Partner/Spouse:     Support Group Member(s):     Co-Worker(s):     Taking Action:    Ways to [place]:      Self-Reflection & Planning.  Assessed patient s progress completing forms related to Illness Management Recovery (including Personal Plan of Care, Adult Coping Plan, and My Support and Coping Plan) and assisted as needed.    Encouraged patient to continue to consider triggers, wellness strategies, early warning signs, feedback from others, actions to take to prevent relapse, and coping strategies as part of a plan to remain well after leaving the hospital.          Outcome: No Change    01/31/17 2223   Depressive Symptoms   Depressive Symptoms Assessed all   Depressive Symptoms Present affect;mood;anxiety;insight;thought process     48 hour    Patient's goal for the day is to finish a crossword puzzle.  The patient met this goal.  Patient is calm.  Attended and participated in groups. Occasionally in the [facility].  Patient reported her depression 5/10 and anxiety 3/10.  Patient denies SI , SIB and HI.  Independent with ADL's.  Patients mother visited.  Patient reports an increase of depression and anxiety after talking with mom.  Patient declined prn for anxiety.  Will  continue to monitor and provide encouragement.

## 2017-02-01 NOTE — PROGRESS NOTES
Pt was present in the milieu and social with others. Pt attended all groups and was an active participant. Pt had a full range affect and seemed positive today. Nino overheard pt say that its kind of nice being in here and not having to be an adult for a while. Pt reported being depressed and anxious 5/10. Pt reported being stressed about having to make a call to a parent about skipping this school semester. Pt reported having some racing thoughts around the matter.      02/01/17 1400   Behavioral Health   Hallucinations denies / not responding to hallucinations   Thinking intact   Orientation person: oriented;place: oriented;date: oriented;time: oriented   Memory baseline memory   Insight admits / accepts   Judgement intact   Eye Contact at examiner   Affect full range affect   Mood depressed;anxious   Physical Appearance/Attire attire appropriate to age and situation   Hygiene well groomed   Suicidality other (see comments)  (denies')   Self Injury other (see comment)  (denies)   Activity other (see comment)  (present in the milieu)   Speech clear;coherent   Medication Sensitivity no stated side effects   Psychomotor / Gait balanced;steady   Psycho Education   Type of Intervention 1:1 intervention   Response participates, initiates socially appropriate   Hours 0.5   Treatment Detail Check-in   Activities of Daily Living   Hygiene/Grooming independent   Oral Hygiene independent   Dress independent   Room Organization independent

## 2017-02-02 PROCEDURE — H2032 ACTIVITY THERAPY, PER 15 MIN: HCPCS

## 2017-02-02 PROCEDURE — 90832 PSYTX W PT 30 MINUTES: CPT

## 2017-02-02 PROCEDURE — 12400001 ZZH R&B MH UMMC

## 2017-02-02 PROCEDURE — 25000132 ZZH RX MED GY IP 250 OP 250 PS 637: Performed by: PSYCHIATRY & NEUROLOGY

## 2017-02-02 RX ORDER — FLUOXETINE 10 MG/1
10 CAPSULE ORAL DAILY
Qty: 30 CAPSULE | Refills: 1 | Status: SHIPPED | OUTPATIENT
Start: 2017-02-02 | End: 2022-04-11

## 2017-02-02 RX ADMIN — FLUOXETINE 10 MG: 10 CAPSULE ORAL at 09:12

## 2017-02-02 NOTE — PROGRESS NOTES
02/01/17 2200   Behavioral Health   Hallucinations denies / not responding to hallucinations   Thinking intact   Orientation person: oriented;place: oriented;date: oriented;time: oriented   Memory baseline memory   Insight admits / accepts   Judgement intact   Eye Contact at examiner   Affect full range affect   Mood depressed   Physical Appearance/Attire attire appropriate to age and situation   Hygiene well groomed   Suicidality other (see comments)  (patient denies)   Self Injury other (see comment)  (patient denies)   Activity other (see comment)  (active in milieu)   Speech clear;coherent   Medication Sensitivity no stated side effects   Psychomotor / Gait balanced;steady     Patient says they are struggling with gender identity and would prefer gender neutral pronouns. Patient says they haven't had thoughts of SI or SIB since Sunday. Said they are doing okay, feels good that they told father about gender identify struggles, but father was not very understanding. Mother is more understanding. Has been active in milieu and been pleasant with patients & staff. Appetite is typical but says they are a light sleeper and would appreciate staff using a flashlight to check or keep door cracked at night. Patient stated they would continue to seek out staff if feeling anxious.

## 2017-02-02 NOTE — PROGRESS NOTES
"   02/01/17 1800   Art Therapy   Type of Intervention structured groups   Response participates, initiates socially appropriate   Hours 3.5   \"Me\" collages , free choice art and leisure and relaxing music time. Pt was cooperative, engaged and pleasant.This pt worked very hard on her collage and explained in detail how it was about having body dysmorphic issues and not having family support. This writer met 1:1 with her today and will keep working on that theme in art tomorrow.  "

## 2017-02-02 NOTE — PROGRESS NOTES
1:1 with pt about exploring her body dysphoria with art. She completed a piece yesterday, and today and has a direction for tomorrow  That she is currently working on and will continue to work on on Friday if there is time in OT free time groups.

## 2017-02-02 NOTE — PROGRESS NOTES
02/02/17 1300   Art Therapy   Type of Intervention structured groups   Response observes from a distance   Hours 1.5   Art Therapy directive- choose a symbol and a word that describe you. Pt didn't do that, she kept working on her anime. She does want to do a body dysphoric art exploration this afternoon 1:1. Pt was quiet today.

## 2017-02-02 NOTE — PROGRESS NOTES
02/02/17 1300   Behavioral Health   Hallucinations denies / not responding to hallucinations   Thinking intact   Orientation person: oriented;place: oriented;date: oriented;time: oriented   Memory baseline memory   Insight admits / accepts   Judgement intact   Eye Contact at examiner   Affect full range affect   Mood mood is calm   Physical Appearance/Attire attire appropriate to age and situation;neat   Hygiene well groomed   Suicidality other (see comments)  (denies)   Self Injury other (see comment)  (denies)   Activity other (see comment)  (active in the milieu)   Speech clear;coherent   Medication Sensitivity no stated side effects   Psychomotor / Gait balanced   Behavioral Health Interventions   Depression maintain safe secure environment;assist patient in developing safety plan;assist patient in following safety plan;encourage nutrition and hydration;encourage participation / independence with adls;maintain safety precautions;provide emotional support;assist with developing and utilizing healthy coping strategies;establish therapeutic relationship;build upon strengths   Social and Therapeutic Interventions (Depression) encourage socialization with peers;encourage effective boundaries with peers;encourage participation in therapeutic groups and milieu activities     Pt is calm, pleasant and approachable. Pt is social and active with peer on the milieu. Pt attended and participated in groups. Pt is hopeful. Pt anticipates difficult conversation with father later today about gender identification, Pt requests support from staff after. Pt will approach staff when in need to talk. Pt denies SI and SIB. Pt denies hallucinations/psychotic symptoms.

## 2017-02-03 VITALS
RESPIRATION RATE: 16 BRPM | WEIGHT: 155.2 LBS | SYSTOLIC BLOOD PRESSURE: 115 MMHG | TEMPERATURE: 98.3 F | HEART RATE: 80 BPM | DIASTOLIC BLOOD PRESSURE: 72 MMHG | OXYGEN SATURATION: 96 %

## 2017-02-03 PROCEDURE — 97150 GROUP THERAPEUTIC PROCEDURES: CPT | Mod: GO

## 2017-02-03 PROCEDURE — 25000132 ZZH RX MED GY IP 250 OP 250 PS 637: Performed by: PSYCHIATRY & NEUROLOGY

## 2017-02-03 PROCEDURE — 99239 HOSP IP/OBS DSCHRG MGMT >30: CPT | Performed by: CLINICAL NURSE SPECIALIST

## 2017-02-03 PROCEDURE — 90853 GROUP PSYCHOTHERAPY: CPT

## 2017-02-03 RX ADMIN — FLUOXETINE 10 MG: 10 CAPSULE ORAL at 08:21

## 2017-02-03 ASSESSMENT — ACTIVITIES OF DAILY LIVING (ADL)
ORAL_HYGIENE: INDEPENDENT
GROOMING: HANDWASHING;INDEPENDENT
LAUNDRY: WITH SUPERVISION
ORAL_HYGIENE: INDEPENDENT
GROOMING: INDEPENDENT
DRESS: STREET CLOTHES;INDEPENDENT
DRESS: INDEPENDENT

## 2017-02-03 NOTE — PROGRESS NOTES
Pt was present and social in the milieu. Pt attended all groups and participated. Pt appeared to have a bright affect during the day. Pt spent a lot of time socializing with others. Pt denies SI SIB and depression and anxiety. Pt appears calm and to be doing well.        02/03/17 1500   Behavioral Health   Hallucinations denies / not responding to hallucinations   Thinking intact   Orientation place: oriented;person: oriented;date: oriented;time: oriented   Memory baseline memory   Insight admits / accepts   Judgement intact   Affect full range affect   Mood mood is calm   Physical Appearance/Attire attire appropriate to age and situation   Hygiene well groomed   Suicidality other (see comments)  (denies)   Self Injury other (see comment)  (denies)   Activity other (see comment)  (present in the milieu)   Speech clear;coherent   Medication Sensitivity no stated side effects   Psychomotor / Gait balanced;steady   Psycho Education   Type of Intervention 1:1 intervention   Response participates, initiates socially appropriate   Hours 0.5   Treatment Detail Check-in   Activities of Daily Living   Hygiene/Grooming independent   Oral Hygiene independent   Dress independent   Room Organization independent

## 2017-02-03 NOTE — DISCHARGE SUMMARY
Psychiatric Discharge Summary    Hana Naegele MRN# [id number]   Age: 21 year old YOB: 1995     Date of Admission:  1/29/2017  Date of Discharge:  2/03/2017  Admitting Physician:  Leonid De La Cruz MD  Discharge Physician:  Debra Naegele APRN, CNS (Contact: [phone])         Event Leading to Hospitalization:   Hana Naegele is a 21-year-old woman who reports that she has been increasingly depressed over the last year.  She has had suicidal thoughts and does feel she can keep herself safe.  She indicates that her depression really started to worsen when she found out that her school was closing.  She was going to the [employer] in [place].  She left because her choices were either to stay on and not be covered under any sort of federal loan thing or go to the [place] [employer], so she elected to go there instead.  She said that this caused her to lose contact with a number of friends at the school.  In addition, she had some friends who had planned to go to that school and she was going to live with them so she lost her roommates.  She has also struggled with finding some roommates as she has moved around a few times.  She indicates that her grandparents on her father's side pay for her tuition but she feels that there is a lot strings with this.  They are very inconsistent on whether or not they want her to work or focus on school, and she feels that they are overly intrusive in the way they monitor how she is doing.  She has been told since November of last year that she needs to give her user name and password so they can log in and see all her verifications of classroom attendance.  She is frustrated about this because that means they would also have access to emails, any communication with professors and basically a lot of potentially personal things.  They are unwilling to pay for tuition on her behalf without these things.  In addition, they have been trying to get  her a new cell phone and she feels that they want to try and track her with her cell phone.  She says she does have some more paranoid thoughts at times but recognizes they are paranoid and does not think that they are real.  These worsen with her depression.  Apparently in September she had contemplated jumping off of the [employer] in a suicide attempt after she had a fight with some friends; however, she changed her mind after recalling a previous conversation that day with another friend.  She has never had an actual suicide attempt.  She does not feel suicidal today, but was having some suicidal thoughts last night.  She does have a history of being on Prozac; however, she is not interested on being on a medication at this time as she does not want to become dependent on them.  She is interested in seeing a therapist outside the hospital.  The patient does hint at some other issues that she says she has some gender dysphoria.  She talks about some interests of hers in the past and intentionally referred to that person in gender neutral terms multiple times.  She is resistant to discussing these issues at this time.  She reports that her sleep is good.  Her appetite is good.  She says her daytime energy and motivation are reasonable.  She listens to music.  She likes to draw.  She also plays video games throughout the day.  She says she does not leave the house, however.              See Admission note by Leonid De La Cruz MD on 1/30/2017 for additional details.          DIagnoses:   1.  Major depressive disorder, recurrent, severe without psychotic features.    2.  Gender dysphoria           Labs:     Results for orders placed or performed during the hospital encounter of 01/29/17   Drug abuse screen 6 urine (tox)   Result Value Ref Range    Amphetamine Qual Urine  NEG     Negative   Cutoff for a negative amphetamine is 500 ng/mL or less.      Barbiturates Qual Urine  NEG     Negative   Cutoff  for a negative barbiturate is 200 ng/mL or less.      Benzodiazepine Qual Urine  NEG     Negative   Cutoff for a negative benzodiazepine is 200 ng/mL or less.      Cannabinoids Qual Urine  NEG     Negative   Cutoff for a negative cannabinoid is 50 ng/mL or less.      Cocaine Qual Urine  NEG     Negative   Cutoff for a negative cocaine is 300 ng/mL or less.      Ethanol Qual Urine  NEG     Negative   Cutoff for a negative urine ethanol is 0.05 g/dL or less      Opiates Qualitative Urine  NEG     Negative   Cutoff for a negative opiate is 300 ng/mL or less.     HCG qualitative urine   Result Value Ref Range    HCG Qual Urine Negative NEG   CBC with platelets differential   Result Value Ref Range    WBC 6.9 4.0 - 11.0 10e9/L    RBC Count 4.32 3.8 - 5.2 10e12/L    Hemoglobin 14.2 11.7 - 15.7 g/dL    Hematocrit 39.0 35.0 - 47.0 %    MCV 90 78 - 100 fl    MCH 32.9 26.5 - 33.0 pg    MCHC 36.4 31.5 - 36.5 g/dL    RDW 13.3 10.0 - 15.0 %    Platelet Count 204 150 - 450 10e9/L    Diff Method Automated Method     % Neutrophils 45.4 %    % Lymphocytes 42.7 %    % Monocytes 6.8 %    % Eosinophils 4.5 %    % Basophils 0.3 %    % Immature Granulocytes 0.3 %    Nucleated RBCs 0 0 /100    Absolute Neutrophil 3.2 1.6 - 8.3 10e9/L    Absolute Lymphocytes 3.0 0.8 - 5.3 10e9/L    Absolute Monocytes 0.5 0.0 - 1.3 10e9/L    Absolute Eosinophils 0.3 0.0 - 0.7 10e9/L    Absolute Basophils 0.0 0.0 - 0.2 10e9/L    Abs Immature Granulocytes 0.0 0 - 0.4 10e9/L    Absolute Nucleated RBC 0.0    Basic metabolic panel   Result Value Ref Range    Sodium 142 133 - 144 mmol/L    Potassium 3.6 3.4 - 5.3 mmol/L    Chloride 109 94 - 109 mmol/L    Carbon Dioxide 25 20 - 32 mmol/L    Anion Gap 8 3 - 14 mmol/L    Glucose 79 70 - 99 mg/dL    Urea Nitrogen 9 7 - 30 mg/dL    Creatinine 0.69 0.52 - 1.04 mg/dL    GFR Estimate >90  Non  GFR Calc   >60 mL/min/1.7m2    GFR Estimate If Black >90   GFR Calc   >60 mL/min/1.7m2    Calcium  "8.8 8.5 - 10.1 mg/dL   Lipid panel   Result Value Ref Range    Cholesterol 113 <200 mg/dL    Triglycerides 69 <150 mg/dL    HDL Cholesterol 33 (L) >49 mg/dL    LDL Cholesterol Calculated 66 <100 mg/dL    Non HDL Cholesterol 80 <130 mg/dL   TSH with free T4 reflex and/or T3 as indicated   Result Value Ref Range    TSH 1.77 0.40 - 4.00 mU/L   Vitamin D   Result Value Ref Range    Vitamin D Deficiency screening 20 20 - 75 ug/L              Consults:   No consults this admission         Hospital Course:   Hana Naegele was admitted to [facility] with attending Leonid De La Cruz MD as a voluntary patient. The patient was placed under status 15 (15 minute checks) to ensure patient safety.     Patient was admitted for suicidal ideation. She has been an active participant in hospital programming. She was willing to stat Prozac to target her depressive symptoms. She is willing to start therapy . She will be making an appointment with a psychologist regarding her gender issues. She has bene very cooperative since her admission.     Hana Naegele did participate in groups and was visible in the milieu. The patient's symptoms of suicidal ideation improved. She has been developing coping skills. She reports that she does not have suicidal ideation. She will be returning to her apartment with her mother . She reports that she will be able to keep herself safe. She has protective facto of supportive mother. She says that she has to work out her college and living situation with her parents. She says that will be a \"big conversation.\"     Hana Naegele was released to home. At the time of discharge Hana Naegele was determined to not be a danger to herself or others.          Discharge Medications:     Current Discharge Medication List      START taking these medications    Details   FLUoxetine (PROZAC) 10 MG capsule Take 1 capsule (10 mg) by mouth daily  Qty: 30 capsule, Refills: 1    Associated Diagnoses: MDD (major depressive " disorder), recurrent severe, without psychosis (H)         CONTINUE these medications which have NOT CHANGED    Details   Multiple Vitamins-Minerals (MULTIVITAMIN ADULT) CHEW Take 1 chew tab by mouth daily                  Psychiatric Examination:   Appearance:  awake, alert and adequately groomed  Attitude:  cooperative  Eye Contact:  good  Mood:  good  Affect:  appropriate and in normal range  Speech:  clear, coherent  Psychomotor Behavior:  no evidence of tardive dyskinesia, dystonia, or tics  Thought Process:  logical, linear and goal oriented  Associations:  no loose associations  Thought Content:  no evidence of suicidal ideation or homicidal ideation  Insight:  fair  Judgment:  fair  Oriented to:  time, person, and place  Attention Span and Concentration:  intact  Recent and Remote Memory:  intact  Language: Able to name objects, Able to repeat phrases and Able to read and write  Fund of Knowledge: adeuqate  Muscle Strength and Tone: normal  Gait and Station: Normal         Discharge Plan:   See AVS    Attestation:  The patient has been seen and evaluated by me,  Debra Naegele APRN, CNS on 2/3/2017  Discharge time > 30 minutes

## 2017-02-03 NOTE — PROGRESS NOTES
02/02/17 2200   Behavioral Health   Hallucinations denies / not responding to hallucinations   Thinking intact   Orientation person: oriented;place: oriented;date: oriented;time: oriented   Memory baseline memory   Insight admits / accepts   Judgement intact   Eye Contact at examiner   Affect full range affect   Mood mood is calm   Physical Appearance/Attire attire appropriate to age and situation   Hygiene well groomed   Suicidality other (see comments)  (patient denies)   Self Injury other (see comment)  (patient denies)   Activity other (see comment)  (active in milieu)   Speech clear;coherent   Medication Sensitivity no stated side effects   Psychomotor / Gait balanced;steady       No thoughts of SI or SIB. Had a good visit with mother and talked about gender identity. Appetite is good, and sleep was good. Rates depression as a 5, but was calm & social on unit. Feels okay about discharge and states she will seek out support when feeling anxious/depressed.

## 2017-02-03 NOTE — PROGRESS NOTES
Pt discharged to mother at 1825 without incident. Pt denies SI, SIB and hallucinations and states that she feels safe to go home. AVS and medications reviewed with pt and she verbalized understanding. Pt was able to identify a strong support system and helpful coping skills. Pt belongings and security items returned to pt.

## 2017-02-03 NOTE — DISCHARGE INSTRUCTIONS
Behavioral Discharge Planning and Instructions      Summary: You were admitted on 1/29/2017 to [address] for Depression and Suicidal Ideations. You were treated by Dr. Leonid De La Cruz MD and discharged on 02/03/2017.     Disposition: Discharged to home    Main Diagnosis:  Major Depressive D/O    Health Care Follow-up Appointments:   Medication Management: No appointments were set up as you are looking into various providers and report that you will schedule the appointment on your own.    Therapy Appointment/Referrals:   No Appt has been set up yet as you report that you will be applying for MA (MNSURE when you get back home)+    [facility] [place] [facility]  [address] [place] [id number]  Phone: [phone]    Day Treatment Appointment:   Date: 02/10/17 (Initial Assessment)  Time: 2:00 PM      Provider: [facility]   Address: [address] [place] [id number]  Phone:[phone]      Attend all scheduled appointments with your outpatient providers. Call at least 24 hours in advance if you need to reschedule an appointment to ensure continued access to your outpatient providers.   Major Treatments, Procedures and Findings: You were provided with: a psychiatric assessment, assessed for medical stability, medication evaluation and/or management, group therapy, art therapy, milieu management, medical interventions, skills/OT groups and family discharge conference.    Symptoms to Report: If you experience any of the following symptoms please report them right away to your provider or to family/friends; increased confusion, losing more sleep, mood getting worse or thoughts of suicide.    Early warning signs can include: Early warning signs that could signal a potential relapse could include but not limited to the following; increased depression or anxiety sleep disturbances increased thoughts or behaviors of suicide or self-harm  ".    Safety and Wellness: Take all medicines as directed. Make no changes unless your doctor suggests them.      Follow treatment recommendations. Refrain from alcohol and non-prescribed drugs.  If there is a concern for safety, call 911..    Resources:    Crisis Intervention: 514.490.4544 or 542-904-7096 (TTY: 455.872.7998).  Call anytime for help.  National [place] [employer] Mental Illness (www.mn.lynn.org): 524.593.3485 or 204-609-5431.  National Suicide Prevention Line (www.mentalhealthmn.org): 995-352-OSTO (1711)  [facility] Crisis (COPE) Response - Adult [phone]  Text 4 Life: txt \"LIFE\" to 81543 for immediate support and crisis intervention  Crisis text line: Text \"START\" to 595-790. Free, confidential, 24/7.  Crisis Intervention: 485.162.2923 or 359-668-8301. Call anytime for help.     The treatment team has appreciated the opportunity to work with you. Dany ,  please take care and make your recovery a daily recovery. If you have any questions or concerns our unit number is [phone].  You will be receiving a follow-up phone call within the next three days from a representative from behavioral health.  You have identified the best phone number to reach you as [phone].        "

## 2017-02-07 ENCOUNTER — BEH TREATMENT PLAN (OUTPATIENT)
Dept: BEHAVIORAL HEALTH | Facility: CLINIC | Age: 22
End: 2017-02-07
Attending: PSYCHIATRY & NEUROLOGY

## 2017-02-07 ENCOUNTER — HOSPITAL ENCOUNTER (OUTPATIENT)
Dept: BEHAVIORAL HEALTH | Facility: CLINIC | Age: 22
Discharge: HOME | End: 2017-02-07
Attending: PSYCHIATRY & NEUROLOGY | Admitting: PSYCHIATRY & NEUROLOGY
Payer: COMMERCIAL

## 2017-02-07 PROCEDURE — 90791 PSYCH DIAGNOSTIC EVALUATION: CPT

## 2017-02-07 ASSESSMENT — PAIN SCALES - GENERAL: PAINLEVEL: MILD PAIN (2)

## 2017-02-07 NOTE — PROGRESS NOTES
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 support, knowledge about her symptoms, and tools to manage her depressive and emotional lability symptoms.       Prognosis is Good. Without the recommended intervention, the client is likely to experience the following consequences of their symptoms: Increase in symptoms, decrease in overall functioning with possibility of requiring a higher level of care and intervention, including re-hosptialization    Referrals to services required by statute or rule:   Report to child/adult protection services was NA.   Was/were discussed and client will pursue. Individual therapy, psychiatry    Program Recommendation: Day Treatment (DT) .      Assessment Completed by: KELVIN Hughes

## 2017-02-07 NOTE — PROGRESS NOTES
MY COPING PLAN FOR SAFETY          Things that are most important to me & reasons for living: Doing what I love - I am a  hopeful, hobbyist               My relapse Warning Signs: multiple hours of constanza and isolation. Irregular sleep schedules  I will make my environment safer by: Changing my mental process - convince myself not to isolate indoors all the time  When in crisis, I will use the following coping skills: (relaxation/self-soothing/distraction/activity):  Calling a support person, locating somewhere safe, talk to friends, distract with music, puzzles  I will use My Support System:   Personal Supports: I can ask for reminders, support, or for them to stay with me  Trusted Friend/s:  Nini Avila Logan, Kerri   Family Member/s : Mom               Professional Supports: I can ask for med changes, emergency appointments, help  Psychiatrist: N/A  Therapist: N/A - Will be establishing care at [facility] (once Dad willing to pay/insurance)    Crisis Lines I can call to discuss options and to access support:  By [facility]:    [place] ([facility]) [phone]   [place] (Mental Health Crisis Program) [phone]   [place]  [phone]   [place] (COP[facility]) [phone]   [place] [phone]   [place] ([facility] Crisis Line) [phone]   [place] ([place], [place], [place], [place], [facility]) [phone]   Other Crisis Lines:   C[facility] (Counseling) [phone]   National Suicide Prevention [phone]   Suicide Prevention [phone]      X   I will attend my Treatment Program and talk to my one of my therapists:       X   I agree that I will report any current / recent thoughts, impulses or plans of suicide,           homicide, self injurious behavior or substance use .   X   I agree that I will not act on the above symptoms, but will follow the above plan         instead.  I can also go to the Emergency Department at [place]  [facility]: [facility] [phone] or call: [phone]

## 2017-02-07 NOTE — PROGRESS NOTES
Acknowledgement of Current Treatment Plan       I have reviewed my treatment plan with my therapist / counselor on 5/10/2017. I agree with the plan as it is written in the electronic health record.    Name Signature   Hana Naegele    Name of Therapist / Counselor    Zaida Humphries Therapist

## 2017-02-07 NOTE — PROGRESS NOTES
"Initial Individual Treatment Plan     Patient: Hana Naegele   MRN: [id number]  : 1995  Age: 21 year old  Sex: female    Diagnostic Assessment Date / Date of Initial Individual Treatment Plan: 17      Immediate Health Concerns:  No     Immediate Safety Concerns:  No. Per history, see safety plan.    Identify the issues to be addressed in treatment:  Symptom Management, Personal Safety, Community Resources/Discharge Planning, Develop / Improve Independent Living Skills, Develop Socialization / Interpersonal Relationship Skills and Cultural / Spirituality    Client Initial Individualized Goals for Treatment: \"Decreased isolation. Self-discovery,  Increase in confidence, and regaining the ability to make rational life decisions\".    Initial Treatment suggestions for the client during the time between Diagnostic Assessment and completion of the Individualized Treatment Plan:  Follow Safety Plan   Ask for more information, support and/or assistance as needed.  Follow up with providers/community supports as needed:   Report increases or changes in symptoms to staff.  Report any personal safety concerns to staff.   Take medications as prescribed.  Report medication changes and/or side effects to staff.  Attend and participate in groups as scheduled or notify staff if unable to do so.  Report any use of substances to staff as this may impact your symptoms and/or  personal safety.  Notify staff if you have any other issues that need to be addressed. This may include  any current abuse / neglect / exploitation or other vulnerability.  Follow recommendations of your treatment team and discuss concerns if not in  agreement.     Treatment Team Responsible: Day Treatment (DT)      Therapeutic Interventions/Treatment Strategies may include:  Support, Redirection, Feedback, Limit/Boundaries, Safety Assessments, Structured Activity, Problem Solving, Clarification, Education, Motivational Enhancement and Relapse Prevention " as needed.    Heidy Muse, LICSW

## 2017-02-13 ENCOUNTER — TELEPHONE (OUTPATIENT)
Dept: BEHAVIORAL HEALTH | Facility: CLINIC | Age: 22
End: 2017-02-13

## 2017-02-13 NOTE — TELEPHONE ENCOUNTER
Writer called and left a message on Dany's voicemail asking for a return call to discuss the program and start dates. She had been scheduled to start on Friday, 2/10, and did not show up for programming on Friday or today. Will follow-up with another phone call in a few days if she does not call back.

## 2017-02-15 ENCOUNTER — HOSPITAL ENCOUNTER (OUTPATIENT)
Dept: BEHAVIORAL HEALTH | Facility: CLINIC | Age: 22
End: 2017-02-15
Attending: PSYCHIATRY & NEUROLOGY
Payer: COMMERCIAL

## 2017-02-15 PROBLEM — F33.9 MAJOR DEPRESSIVE DISORDER, RECURRENT EPISODE WITH ANXIOUS DISTRESS (H): Status: ACTIVE | Noted: 2017-02-15

## 2017-02-15 PROCEDURE — H2012 BEHAV HLTH DAY TREAT, PER HR: HCPCS

## 2017-02-15 ASSESSMENT — ANXIETY QUESTIONNAIRES
3. WORRYING TOO MUCH ABOUT DIFFERENT THINGS: NEARLY EVERY DAY
6. BECOMING EASILY ANNOYED OR IRRITABLE: NEARLY EVERY DAY
GAD7 TOTAL SCORE: 16
2. NOT BEING ABLE TO STOP OR CONTROL WORRYING: NEARLY EVERY DAY
5. BEING SO RESTLESS THAT IT IS HARD TO SIT STILL: SEVERAL DAYS
7. FEELING AFRAID AS IF SOMETHING AWFUL MIGHT HAPPEN: MORE THAN HALF THE DAYS
IF YOU CHECKED OFF ANY PROBLEMS ON THIS QUESTIONNAIRE, HOW DIFFICULT HAVE THESE PROBLEMS MADE IT FOR YOU TO DO YOUR WORK, TAKE CARE OF THINGS AT HOME, OR GET ALONG WITH OTHER PEOPLE: SOMEWHAT DIFFICULT
1. FEELING NERVOUS, ANXIOUS, OR ON EDGE: MORE THAN HALF THE DAYS

## 2017-02-15 ASSESSMENT — PATIENT HEALTH QUESTIONNAIRE - PHQ9: 5. POOR APPETITE OR OVEREATING: MORE THAN HALF THE DAYS

## 2017-02-16 ASSESSMENT — PATIENT HEALTH QUESTIONNAIRE - PHQ9: SUM OF ALL RESPONSES TO PHQ QUESTIONS 1-9: 19

## 2017-02-16 ASSESSMENT — ANXIETY QUESTIONNAIRES: GAD7 TOTAL SCORE: 16

## 2017-02-16 NOTE — PROGRESS NOTES
"  Adult Mental Health Outpatient Group Therapy Progress Note     Client Initial Individualized Goals for Treatment:  \"Decreased isolation. Self-discovery, Increase in confidence, and regaining the ability to make rational life decisions\".    See Initial Treatment suggestions for the client during the time between Diagnostic Assessment and completion of the Master Individualized Treatment Plan    Treatment Goals:  See above     Area of Treatment Focus:  Symptom Management    Therapeutic Interventions/Treatment Strategies:  Support, Feedback, Safety Assessments, Structured Activity and Education    Response to Treatment Strategies:  Accepted Feedback, Listened, Attentive, Accepted Support and Alert    Name of Group:  Life Skills     Description and Outcome:  Client presented with calm,even mood during first session in the Day Treatment Program.Good focus and concentration as she completed initial assessment paperwork. Some initial screens were positive which included ADHD,bipolar and anxiety. She agreed to further screening for ADHD since her mother was considering this for her while she was in high school but it was never completed. She indicated that she would talk to her new medical provider about the positive bipolar screen.Continue to assess.    Is this a Weekly Review of the Progress on the Treatment Plan?  No        "

## 2017-02-16 NOTE — PROGRESS NOTES
Group Therapy Progress Notes     Area of Treatment Focus:  Symptom Management, Personal Safety, Community Resources/Discharge Planning, Develop / Improve Independent Living Skills and Develop Socialization / Interpersonal Relationship Skills    Therapeutic Interventions/Treatment Strategies:  Support, Feedback and Safety Assessments    Response to Treatment Strategies:  Accepted Feedback and Accepted Support    Name of Group:  Group Psychotherapy at 11:00am    Progress Note  Today was Dany's first day in group therapy.  She expressed feelings of anxiousness as she was unsure of how the group therapy process worked.  Group facilitator tried to ease Dany's uneasiness by explaining that she could contribute as little or as much as she wanted today.  She did open up to the group and explained that she has been isolated in her apartment for the last two months.  She is currently taking a semester off from school. This is creating a hostile situation between her and her father, as he is paying for Dany to be in her own apartment under the condition that she be enrolled in school.  She is scheduled to have a meeting with her father regarding this issue in the next couple of days.  Dany requested more information on transitional housing.  Group facilitator will collect this for Dany and present it to her on Friday.              Is this a Weekly Review of the Progress on the Treatment Plan?  No

## 2017-02-17 ENCOUNTER — TELEPHONE (OUTPATIENT)
Dept: BEHAVIORAL HEALTH | Facility: CLINIC | Age: 22
End: 2017-02-17

## 2017-02-17 NOTE — TELEPHONE ENCOUNTER
Writer called and left  for Dany as she did not attend Day Treatment today. Write left direct dial, as well as main program # for call back and that we hope to see client Monday in group.

## 2017-02-22 ENCOUNTER — TELEPHONE (OUTPATIENT)
Dept: BEHAVIORAL HEALTH | Facility: CLINIC | Age: 22
End: 2017-02-22

## 2017-02-22 NOTE — TELEPHONE ENCOUNTER
Writer left another  for client requesting she call back treatment team, writer left that writer will call emergency contact this week if client doesn't return call. Left Direct dial for call back, Requesting client call and inform team if she plans on returning to program or assistance with other resources.

## 2017-02-24 ENCOUNTER — TELEPHONE (OUTPATIENT)
Dept: BEHAVIORAL HEALTH | Facility: CLINIC | Age: 22
End: 2017-02-24

## 2017-02-24 NOTE — TELEPHONE ENCOUNTER
Writer left VM for clients emergency contact, Mother, Ana as we have been unable to reach client. Left VM to have client or mom follow up with day treatment program staff and/if client needs any other resources.

## 2017-02-27 ENCOUNTER — TELEPHONE (OUTPATIENT)
Dept: BEHAVIORAL HEALTH | Facility: CLINIC | Age: 22
End: 2017-02-27

## 2017-02-27 ENCOUNTER — HOSPITAL ENCOUNTER (OUTPATIENT)
Dept: BEHAVIORAL HEALTH | Facility: CLINIC | Age: 22
End: 2017-02-27
Attending: PSYCHIATRY & NEUROLOGY
Payer: COMMERCIAL

## 2017-02-27 PROCEDURE — H2012 BEHAV HLTH DAY TREAT, PER HR: HCPCS

## 2017-02-27 NOTE — TELEPHONE ENCOUNTER
Received VM from clients mom, Ana stating she spoke with client and she is safe. Her mother is encouraging her to come to Day Treatment program Monday 2/27/17, and even rearranged her schedule to provide a ride for her on at least Monday and Wed. Mornings, and furthermore stated, she will encourage client to uber/cab home.

## 2017-02-28 NOTE — PROGRESS NOTES
"  Adult Mental Health Outpatient Group Therapy Progress Note        Client Initial Individualized Goals for Treatment: \"Decreased isolation. Self-discovery, Increase in confidence, and regaining the ability to make rational life decisions\".     See Initial Treatment suggestions for the client during the time between Diagnostic Assessment and completion of the Master Individualized Treatment Plan     Treatment Goals: See above     Area of Treatment Focus:  Symptom Management, Personal Safety, Community Resources/Discharge Planning, Develop / Improve Independent Living Skills and Develop Socialization / Interpersonal Relationship Skills    Therapeutic Interventions/Treatment Strategies:  Support, Feedback and Safety Assessments    Response to Treatment Strategies:  Accepted Feedback and Accepted Support    Name of Group:  Mental Health Management at 9:00am and Group Psychotherapy at 10:00am     Description and Outcome:  Mental health management group was used for clients to fill out an individual coping skills booklet.  Dany seemed to enjoy this activity. This is only Dany's second time at day treatment.  She shared that she had been physically sick for the past week so was unable to attend.  She reported feeling more tired than usual.  Dany was unsure of what to share in group therapy today.  Group facilitator prompted Dany by inquiring about how the meeting with her father went, as she disclosed this in the last group therapy. She shared her frustrations as her father is seeming not to understand her depression and the reason behind it.  Dany reported that her father is making her question her own identity, in regards to wanting to transition to male.  She is also in a state of confusion due to political influence.  Dany identifies with liberal views but because of her parents being small business owners, they are more on Libertarian side, so she can identify with this as well.  Group facilitator reminded Dany to " concentrate on her needs/wants while in group therapy.             Is this a Weekly Review of the Progress on the Treatment Plan?  No

## 2017-02-28 NOTE — PROGRESS NOTES
"  Adult Mental Health Outpatient Group Therapy Progress Note     Client Initial Individualized Goals for Treatment:  \"Decreased isolation. Self-discovery, Increase in confidence, and regaining the ability to make rational life decisions\".    See Initial Treatment suggestions for the client during the time between Diagnostic Assessment and completion of the Master Individualized Treatment Plan.    Treatment Goals:    See above     Area of Treatment Focus:  Symptom Management    Therapeutic Interventions/Treatment Strategies:  Support, Feedback, Safety Assessments, Structured Activity and Education    Response to Treatment Strategies:  Accepted Feedback, Listened, Attentive, Accepted Support and Alert    Name of Group:  Life Skills     Description and Outcome:  Client presented with calm,even mood during only her second day in treatment. She has been missed sessions because of illness and social isolation. Good focus and concentration during a discussion related to communication effectiveness. Client acknowledged that she does better with online social communication versus in person but has some troubles with this as well because of misinterpretations of messages sent. Client acknowledged that she is too trustworthy early on in relationships and that has caused some troubles and conflicts.    Is this a Weekly Review of the Progress on the Treatment Plan?  Yes.      Are Treatment Plan Goals being addressed?  Yes, continue treatment goals      Are Treatment Plan Strategies to Address Goals Effective?  Yes, continue treatment strategies      Are there any current contracts in place?  No            "

## 2017-03-01 ENCOUNTER — HOSPITAL ENCOUNTER (OUTPATIENT)
Dept: BEHAVIORAL HEALTH | Facility: CLINIC | Age: 22
End: 2017-03-01
Attending: PSYCHIATRY & NEUROLOGY
Payer: COMMERCIAL

## 2017-03-01 PROCEDURE — H2012 BEHAV HLTH DAY TREAT, PER HR: HCPCS

## 2017-03-03 ENCOUNTER — HOSPITAL ENCOUNTER (OUTPATIENT)
Dept: BEHAVIORAL HEALTH | Facility: CLINIC | Age: 22
End: 2017-03-03
Attending: PSYCHIATRY & NEUROLOGY
Payer: COMMERCIAL

## 2017-03-03 PROCEDURE — H2012 BEHAV HLTH DAY TREAT, PER HR: HCPCS

## 2017-03-03 PROCEDURE — 97150 GROUP THERAPEUTIC PROCEDURES: CPT | Mod: GO

## 2017-03-03 NOTE — PROGRESS NOTES
"                                   Occupational Therapy Assessment     Patient: Hana Naegele    MRN: [id number]     :1995    Age: 21 year old    Sex:female     Assessment     Mood: Euthymic    Affect: Congruent to mood    Thought Content:  Clear    Verbal Content: No observed deficiencies    Concentration: \"I cannot focus that well\"    Energy Level:  \"very tired/fatigued\"    Memory:  Delayed & immediate recall intact    Following Directions: Independently follows multi-step directions    Decision Making:  Independent    Motivation/Procrastination:  Client acknowledges low motivation and procrastination    Planning & Problem Solving:  Independent    Judgment:  Anticipates consequences    Frustration/Stress Management:  \"tend to bottle it up\"    Self Awareness:  Low self esteem/confidence,negative self talk    Interpersonal Skills: \"I often isolate myself,I have huge problems with trusting\"    Social Supports:  \"a lot of people of people leave me\"    Time Management:  tend to do things last minute    Leisure:  No issues or concerns reported    Self-Care: \" I can never get enough\" sleep,\"I never exercise\"    Home Management:  Laundry\"it piles up\", organization (\"lack of motivation\")    Community Resources:  Difficulty leaving the house or being in public places\"isolate myself,anxiety\"    Employment & Education:  No issues or concerns reported    Additional Comments/Plan of Treatment Functional Goals:  ADHD screen was positive which included some input by her mother so it has been recommended that she get a more thorough evaluation. Her anxiety screen was also positive so leaning coping skills to manage will be highly recommended.Areas of dissatisfaction in her life includes friendships,work,family relationships,living situation,finances,belonging to a community and intimate relationships.Initial treatment goals include time management and organization, build new friendships and social supports and decrease " procrastination.      OTR/L Signature: Adrián OLMEDO    Date/Time: 3/3/17

## 2017-03-06 ENCOUNTER — HOSPITAL ENCOUNTER (OUTPATIENT)
Dept: BEHAVIORAL HEALTH | Facility: CLINIC | Age: 22
End: 2017-03-06
Attending: PSYCHIATRY & NEUROLOGY
Payer: COMMERCIAL

## 2017-03-06 PROCEDURE — H2012 BEHAV HLTH DAY TREAT, PER HR: HCPCS

## 2017-03-06 NOTE — PROGRESS NOTES
"  Adult Mental Health Outpatient Group Therapy Progress Note     Client Initial Individualized Goals for Treatment: \"Decreased isolation. Self-discovery, Increase in confidence, and regaining the ability to make rational life decisions\".    See Initial Treatment suggestions for the client during the time between Diagnostic Assessment and completion of the Master Individualized Treatment Plan.    Treatment Goals:     see above.     Area of Treatment Focus:  Symptom Management, Develop / Improve Independent Living Skills and Develop Socialization / Interpersonal Relationship Skills    Therapeutic Interventions/Treatment Strategies:  Support, Feedback, Safety Assessments, Structured Activity and Problem Solving    Response to Treatment Strategies:  Accepted Feedback, Gave Feedback, Listened, Focused on Goals, Attentive and Accepted Support    Name of Group:  Life Skills     Description and Outcome:  Pt attended and participated in a structured occupational therapy group where intervention focused on coping through structured hands-on activities to improve function in valued roles, routines, and independent living skills. Client had an anxious affect in session. Client shared that she has recently \"Had a long period of isolation\". Client discussed small steps she has been taking to decrease isolation. Discussed meaningful occupations to the client, discussed stress related to her leave from school and that her school recently closed from business. Focused on individual creative activity with encouragement from writer. Client verbalized understanding of session content by identifying skills to decrease isolation. Client addressed ITP goal number initial goal in this session.    Is this a Weekly Review of the Progress on the Treatment Plan?  No          "

## 2017-03-06 NOTE — PROGRESS NOTES
"  Adult Mental Health Outpatient Group Therapy Progress Note     Client Initial Individualized Goals for Treatment: \"Decreased isolation. Self-discovery, Increase in confidence, and regaining the ability to make rational life decisions\".      See Initial Treatment suggestions for the client during the time between Diagnostic Assessment and completion of the Master Individualized Treatment Plan      Treatment Goals: See above    Area of Treatment Focus:  Symptom Management, Personal Safety and Community Resources/Discharge Planning    Therapeutic Interventions/Treatment Strategies:  Support, Feedback and Cognitive Behavioral Therapy    Response to Treatment Strategies:  Accepted Feedback, Gave Feedback, Listened and Focused on Goals    Name of Group:  Group Psychotherapy     Description and Outcome:  Dany shared distressed mood due to finding out that her grandfather was just diagnosed with cancer and will have surgery in the next couple of days.  She stated that she otherwise had been \"okay\" before finding out this information.  Peers gave feedback and support on self-care when a loved one has serious illness.  Client denied suicidal ideation, intent and plan.     Is this a Weekly Review of the Progress on the Treatment Plan?  No        "

## 2017-03-07 NOTE — TREATMENT PLAN
Individualized Treatment Plan     Date of Plan:  3/8/17    Name: Hana Naegele MRN: [id number]    : 1995    Programs:  Day Treatment (DT)     Clinical Track (if applicable):  1A    DSM5 Diagnosis  296.33 Major Depressive Disorder, Recurrent Episode, Severe _ and With anxious distress   302.6 Gender Dysphoria    Team Members Contributing to Plan:  Zaida Humphries, Kristin Nguyen  and Nuris Schmidt    Client Strengths:  caring, creative, educated, empathetic, goal-focused, good listener, has a previous history of therapy, insightful, intelligent, motivated, open to learning, open to suggestions / feedback, support of family, friends and providers, supportive, wants to learn and work history.    Client Participation in Plan:  Contributed to goals and plan   Attended individual treatment plan meeting on 3/8/17, 17, 5/10/17  Agrees with plan     Areas of Vulnerability:  Anxiety  Depressive symptoms     Long-Term Goals:  Knowledge about illness and management of symptoms   Maintenance of personal safety     Abuse Prevention Plan:  Safe, therapeutic environment   Safety coping plan as needed   Education regarding illness and skill development   Coordination with care providers     Discharge Criteria:  Satisfactory progress toward treatment goals   Improvement re: identified problems and symptoms   Has a discharge plan in place   Has safety/coping plan in place   Regular attendance as scheduled     Tentative Discharge Date:  17    Areas of Treatment Focus            Area of Treatment Focus:   Personal Safety  Start Date:    3/8/17    Goal:  Target Date: 17, 5/10/17 Status: Active  Client will notify staff when needing assistance to develop or implement a coping plan to manage suicidal or self injurious urges.      Progress:   17: Dany has not had SI, she struggles with some self harm thoughts, and will continue to discuss any urges in group or with staff 1:1. Encouraged use of fidgets for  distraction.  5/10/17: Dany is using fidgets, Dany feels better knowing when to seek extra support or go to ED for suicidal thoughts.        Treatment Strategies:   Assess / reassess level of potential for harm to self or others  Engage in safety planning when indicated  Facilitate increased self awareness        Area of Treatment Focus:   Symptom Stabilization and Management     Start Date:    3/8/17    Goal:  Target Date: 4/5/17,5/10/17 Status: Active     Dany will use time in OT to identify life balance needs and increase daily structure to look at work/school readiness, Gender therapy, and Completing ADHD evaluation.      Progress:   4/12/17: Dany has yet to get her ADHD eval scheduled and this will be her goal in the next week, she has applied for a few jobs and is awaiting calls for potential interview.  5/10/17: Dany has been using OT to job search,  will work in OT with the last few days left to make calls for Individual Therapy and ADHD eval.        Treatment Strategies:   Assist clients in establishing / strengthening support network  Assist to identify treatment goals  Assist with discharge planning  Use reality based supportive approach      Area of Treatment Focus:   Symptom Stabilization and Management  Start Date:    3/8/17    Goal:  Target Date: 4/5/17, 5/10/17 Status: Active  Dany will use time in group therapy to identify pathway to self discovery and reinforcing boundaries with family.        Progress:   4/12/17: Dany has made some progress here and would like to move forward with exploring gender identify, this fits with this [place] goal of finding a LGBTQ support group. Dany's made progress with setting boundaries with sister, dad, and discussing challenges in group.  5/10/17: Dany will reach out to [facility] and feels progress on self discovery has been postponed for other upfront issues, like moving sooner than expected. Dany still feels boundaries with dad and getting better.        Treatment  Strategies:   Facilitate increased self awareness  Provide education regarding Dare setting  Teach adaptive coping skills and communication skills  Use reality based supportive approach      Area of Treatment Focus:   Community Resources / Support and Discharge Planning  Start Date:    3/8/17    Goal:  Target Date: 4/5/17, 5/10/17 Status: Active  Dany will begin to develop and aftercare plan by finding and attending a Mental Health support group specialized around sexual health and inquiring with her Psychiatrist if individual therapy is done with her or if Dany should find an individual therapist.      Progress:   4/12/17:  Dany was given resources for LGBTQ support groups, therapists, and reestablished with psychiatric provider 3/3/17. Will continue to work on getting aftercare plan in place.  5/10/17: Dany still has many appts and calls to make for aftercare and will work on this in OT Clinic, at home, and was encouraged to meet with staff 1:1 of needed to complete these calls.        Treatment Strategies:   Assist clients in establishing / strengthening support network  Assist with discharge planning  Facilitate increased self awareness  Provide education regarding Community Resources

## 2017-03-07 NOTE — PROGRESS NOTES
"  Adult Mental Health Outpatient Group Therapy Progress Note     Client Initial Individualized Goals for Treatment: \"Decreased isolation. Self-discovery, Increase in confidence, and regaining the ability to make rational life decisions\".    See Initial Treatment suggestions for the client during the time between Diagnostic Assessment and completion of the Master Individualized Treatment Plan.    Treatment Goals:     see above.     Area of Treatment Focus:  Symptom Management, Develop / Improve Independent Living Skills and Develop Socialization / Interpersonal Relationship Skills    Therapeutic Interventions/Treatment Strategies:  Support, Feedback, Safety Assessments, Structured Activity and Problem Solving    Response to Treatment Strategies:  Accepted Feedback, Gave Feedback, Listened, Focused on Goals, Attentive and Accepted Support    Name of Group:  Life Skills     Description and Outcome:  Pt attended and participated in a structured occupational therapy group where intervention focused on coping through structured hands-on activities to improve function in valued roles, routines, and independent living skills. Client had a restricted affect in session. She independently initiated a familiar focused activity in session. Adequate task focus. Mostly kept to herself but socialized with peers when peers initiated conversation with her. Client would benefit from additional opportunities to practice and implement content from this session. Client addressed ITP goal number initial goal in this session.    Is this a Weekly Review of the Progress on the Treatment Plan?  Yes.      Are Treatment Plan Goals being addressed?  Yes, continue treatment goals      Are Treatment Plan Strategies to Address Goals Effective?  Yes, continue treatment strategies      Are there any current contracts in place?  No              "

## 2017-03-07 NOTE — PROGRESS NOTES
"  Adult Mental Health Outpatient Group Therapy Progress Note     Client Initial Individualized Goals for Treatment: \"Decreased isolation. Self-discovery, Increase in confidence, and regaining the ability to make rational life decisions\".      See Initial Treatment suggestions for the client during the time between Diagnostic Assessment and completion of the Master Individualized Treatment Plan      Treatment Goals: See above     Area of Treatment Focus:  Symptom Management, Personal Safety, Community Resources/Discharge Planning, Develop / Improve Independent Living Skills and Develop Socialization / Interpersonal Relationship Skills    Therapeutic Interventions/Treatment Strategies:  Support and Feedback    Response to Treatment Strategies:  Accepted Feedback and Accepted Support    Name of Group:  Group Psychotherapy      Description and Outcome:  Dany discussed that her sleep pattern has been off.  Generally she will take long naps during the day and still be able to sleep at night.  More recently, she has been taking shorter naps and not being able to sleep through the night.  Group facilitator ask if Dany ever struggled with nightmares or terrors, which Dany said she has not.  Dany has an appointment with her psychiatrist coming up, so will mention the sleep issue at that time.  Dany discussed more in detail of her feelings of identity confusion.  Group was talking as a whole on preferred gender pronouns in the group. Dany shared that she still identifies with female pronouns, even though she is wanting to eventually transition to male. She feels she needs to look more masculine in order to identify with the pronouns.  Group facilitator discussed having D[facility] possibly make an [facility].  Dany seemed open to the idea , as she would like additional support during this stressful and confusing time.         Is this a Weekly Review of the Progress on the Treatment Plan?  Yes.  "     Are Treatment Plan Goals being addressed?  Yes, continue treatment goals      Are Treatment Plan Strategies to Address Goals Effective?  Yes, continue treatment strategies      Are there any current contracts in place?  No

## 2017-03-07 NOTE — PROGRESS NOTES
"  Adult Mental Health Outpatient Group Therapy Progress Note        Client Initial Individualized Goals for Treatment: \"Decreased isolation. Self-discovery, Increase in confidence, and regaining the ability to make rational life decisions\".      See Initial Treatment suggestions for the client during the time between Diagnostic Assessment and completion of the Master Individualized Treatment Plan      Treatment Goals: See above     Area of Treatment Focus:  Symptom Management, Personal Safety, Community Resources/Discharge Planning, Develop / Improve Independent Living Skills and Develop Socialization / Interpersonal Relationship Skills    Therapeutic Interventions/Treatment Strategies:  Support and Feedback    Response to Treatment Strategies:  Accepted Feedback and Accepted Support    Name of Group:  Mental Health Management at 9:00am     Description and Outcome:  Marion Mckeon [facility] facilitated the mental health management group today. Writer sat in and observed this group. The topic was non-violent communication. Group facilitator had group members pick from a list of evaluative words (attacked, betrayed, blamed, etc) and try to translate them to true feelings and needs. Facilitator asked members to relate these feelings to a specific situation.  Dany chose the evaluative word \"(not) accepted\" and related it to her father's unacceptance to her gender identity.      Is this a Weekly Review of the Progress on the Treatment Plan?  No        "

## 2017-03-08 ENCOUNTER — HOSPITAL ENCOUNTER (OUTPATIENT)
Dept: BEHAVIORAL HEALTH | Facility: CLINIC | Age: 22
End: 2017-03-08
Attending: PSYCHIATRY & NEUROLOGY
Payer: COMMERCIAL

## 2017-03-08 VITALS — BODY MASS INDEX: 25.16 KG/M2 | HEIGHT: 65 IN | WEIGHT: 151 LBS

## 2017-03-08 PROCEDURE — H2012 BEHAV HLTH DAY TREAT, PER HR: HCPCS

## 2017-03-08 PROCEDURE — 97150 GROUP THERAPEUTIC PROCEDURES: CPT | Mod: GO

## 2017-03-08 NOTE — PROGRESS NOTES
"  Adult Mental Health Outpatient Group Therapy Progress Note        Client Initial Individualized Goals for Treatment: \"Decreased isolation. Self-discovery, Increase in confidence, and regaining the ability to make rational life decisions\".      See Initial Treatment suggestions for the client during the time between Diagnostic Assessment and completion of the Master Individualized Treatment Plan      Treatment Goals: See above     Area of Treatment Focus:  Symptom Management, Personal Safety, Community Resources/Discharge Planning, Develop / Improve Independent Living Skills and Develop Socialization / Interpersonal Relationship Skills    Therapeutic Interventions/Treatment Strategies:  Support and Feedback    Response to Treatment Strategies:  Accepted Feedback and Accepted Support    Name of Group:  Group Psychotherapy      Description and Outcome:  Dany reported that she is going through a tough time lately as she found out recently her mom's dad got diagnosed with esophageal cancer.  She is not very close to him but is still concerned for his well-being.  Dany then went into the struggle of finding her own identity.  She retold the story of telling her father she was transitioning while inpatient.  The response from him was very invalidating to Dany.  She shared how his hurtful words in that moment will always stick with her.  Writer encouraged Dany to keep looking inward, as she is letting her family influence her decisions about her own gender identity.     Is this a Weekly Review of the Progress on the Treatment Plan?  No        "

## 2017-03-08 NOTE — PROGRESS NOTES
"  Adult Mental Health Outpatient Group Therapy Progress Note     Client Initial Individualized Goals for Treatment: \"Decreased isolation. Self-discovery, Increase in confidence, and regaining the ability to make rational life decisions\".    See Initial Treatment suggestions for the client during the time between Diagnostic Assessment and completion of the Master Individualized Treatment Plan.    Treatment Goals: (Next scheduled review: 3.8.17)     see above.     Area of Treatment Focus:  Symptom Management, Develop / Improve Independent Living Skills and Develop Socialization / Interpersonal Relationship Skills    Therapeutic Interventions/Treatment Strategies:  Support, Feedback, Safety Assessments, Structured Activity and Problem Solving    Response to Treatment Strategies:  Accepted Feedback, Gave Feedback, Listened, Focused on Goals, Attentive and Accepted Support    Name of Group:  Life Skills     Description and Outcome:  Pt attended and participated in a structured life skills group session where intervention focused on strategies to improve time management, planning, and organization skills to promote function in daily habits, roles, and routines. Client presented to group with a calm, even affect. Attentive throughout session. Was quiet during structured group activity, but offered input when elicited by writer. Client verbalized understanding of session content by identifying a time management strategy  (setting time limits on tasks) to implement this week. Client addressed ITP goal number initial goal in this session.    Is this a Weekly Review of the Progress on the Treatment Plan?  No          "

## 2017-03-08 NOTE — PROGRESS NOTES
RN Review of Medical History / Physical Health Screen  Outpatient [facility]      CLIENT'S NAME: Hana Naegele  MRN:   [id number]  :   1995 AGE:21 year old SEX: female    DATE OF DIAGNOSTIC ASSESSMENT: 17  DATE OF ADMISSION: 2/15/17, Dany started 2/15/17 and did not return again until 3/1/17, therefore was unable to obtain weight within 1 week.  PROGRAM: Day Treatment (DT)      Following admission, the RN reviewed the following:    - Medical History / Physical Health Screen completed during the DA noted above.  - Immediate Health Concerns as noted on the Initial Individual Treatment Plan.    Client height and weight recorded by RN in epic: yes    BMI Review:  Was the patient informed of BMI? yes      Findings  25.57 Above,  General nutrition education       RN Recommendations include:  RN To provide general nutrition education to maintain normal BMI.    Nuris Schmidt  3/8/2017

## 2017-03-09 ENCOUNTER — TELEPHONE (OUTPATIENT)
Dept: BEHAVIORAL HEALTH | Facility: CLINIC | Age: 22
End: 2017-03-09

## 2017-03-09 NOTE — TELEPHONE ENCOUNTER
Writer called client to discuss recommendation for ADHD Evaluation as discussed in previous session. Writer gave client information to contact [facility] to schedule appointment, client reported that she will do this today. Writer also gave client writer's direct number in case client has any other questions.    Kristin Nguyen, OTR/L

## 2017-03-10 ENCOUNTER — HOSPITAL ENCOUNTER (OUTPATIENT)
Dept: BEHAVIORAL HEALTH | Facility: CLINIC | Age: 22
End: 2017-03-10
Attending: PSYCHIATRY & NEUROLOGY
Payer: COMMERCIAL

## 2017-03-10 PROCEDURE — H2012 BEHAV HLTH DAY TREAT, PER HR: HCPCS

## 2017-03-10 PROCEDURE — 97150 GROUP THERAPEUTIC PROCEDURES: CPT | Mod: GO

## 2017-03-10 NOTE — PROGRESS NOTES
"  Adult Mental Health Outpatient Group Therapy Progress Note     Client Initial Individualized Goals for Treatment: \"Decreased isolation. Self-discovery, Increase in confidence, and regaining the ability to make rational life decisions\".    See Initial Treatment suggestions for the client during the time between Diagnostic Assessment and completion of the Master Individualized Treatment Plan.    Treatment Goals: (Next scheduled review: 3.8.17)     see above.     Area of Treatment Focus:  Symptom Management, Develop / Improve Independent Living Skills and Develop Socialization / Interpersonal Relationship Skills    Therapeutic Interventions/Treatment Strategies:  Support, Feedback, Safety Assessments, Structured Activity and Problem Solving    Response to Treatment Strategies:  Accepted Feedback, Gave Feedback, Listened, Focused on Goals, Attentive and Accepted Support    Name of Group:  Life Skills     Description and Outcome:  Pt attended and participated in a structured occupational therapy group where intervention focused on coping through structured hands-on activities to improve function in valued roles, routines, and independent living skills. Client had a calm, even affect in session. Client discussed areas of occupational that theywould like to address in occupational therapy. Discussed occupational life balance and worked to brainstorm strategies to increase social connectedness and productivity. Client verbalized understanding of session content by identifying outcomes they would like related to social activity and paid employment. Client addressed ITP goal number initial goal in this session.     Is this a Weekly Review of the Progress on the Treatment Plan?  No          "

## 2017-03-13 NOTE — PROGRESS NOTES
"  Adult Mental Health Outpatient Group Therapy Progress Note      Client Initial Individualized Goals for Treatment: \"Decreased isolation. Self-discovery, Increase in confidence, and regaining the ability to make rational life decisions\".     See Initial Treatment suggestions for the client during the time between Diagnostic Assessment and completion of the Master Individualized Treatment Plan.     Treatment Goals: (Next scheduled review: 3.8.17)     see above.    Area of Treatment Focus:  Symptom Management, Personal Safety, Community Resources/Discharge Planning, Develop / Improve Independent Living Skills and Develop Socialization / Interpersonal Relationship Skills    Therapeutic Interventions/Treatment Strategies:  Support and Feedback    Response to Treatment Strategies:  Accepted Feedback and Accepted Support    Name of Group:  Group Psychotherapy      Description and Outcome:  Dany shared that she has a full screening for ADHD on March 28th.  She is thinking the result of this may explain some of her anxiousness.  Dany shared with the group that her grandpa is not doing well and not expected to live much longer.  Dany is not particularly close with this grandpa but she still cares for him and his well-being.  Group gave Dany support as he will be having surgery coming up early next week.  Dany mentioned that her sister came over to visit again last night.  They discussed the troubles her younger sister has been having lately.  Dany seemed concerned for her and encouraged looking into therapy.     Is this a Weekly Review of the Progress on the Treatment Plan?  Yes.      Are Treatment Plan Goals being addressed?  Yes, continue treatment goals      Are Treatment Plan Strategies to Address Goals Effective?  Yes, continue treatment strategies      Are there any current contracts in place?  No            "

## 2017-03-14 NOTE — PROGRESS NOTES
"  Adult Mental Health Outpatient Group Therapy Progress Note     Client Initial Individualized Goals for Treatment: \"Decreased isolation. Self-discovery, Increase in confidence, and regaining the ability to make rational life decisions\".    See Initial Treatment suggestions for the client during the time between Diagnostic Assessment and completion of the Master Individualized Treatment Plan.    Treatment Goals: (Next scheduled review: 4.5.17)    1. Client will notify staff when needing assistance to develop or implement a coping plan to manage suicidal or self injurious urges.    2. Dany will use time in OT to identify life balance needs and increase daily structure to look at work/school readiness, Gender therapy, and Completing ADHD evaluation.    3. Dany will use time in group therapy to identify pathway to self discovery and reinforcing boundaries with family.    4. Dany will begin to develop and aftercare plan by finding and attending a Mental Health support group specialized around sexual health and inquiring with her Psychiatrist if individual therapy is done with her or if Dany should find an individual therapist.    Area of Treatment Focus:  Symptom Management, Develop / Improve Independent Living Skills and Develop Socialization / Interpersonal Relationship Skills    Therapeutic Interventions/Treatment Strategies:  Support, Feedback, Safety Assessments, Structured Activity and Problem Solving    Response to Treatment Strategies:  Accepted Feedback, Gave Feedback, Listened, Focused on Goals, Attentive and Accepted Support    Name of Group:  Life Skills     Description and Outcome:  Pt attended and participated in a structured occupational therapy group where intervention focused on coping through structured hands-on activities to improve function in valued roles, routines, and independent living skills. Client reported increased distress yesterday related to an interaction with her sister. Discussed " strategies she used to set boundaries and to assert her needs. Client problem solved to schedule an ADHD evaluation during session. Interacted occasionally with peers in session. Client demonstrated understanding of session content by following through with her goal to schedule ADHD evaluation appointment. Client addressed ITP goal number 2 in this session.    Is this a Weekly Review of the Progress on the Treatment Plan?  No

## 2017-03-15 ENCOUNTER — HOSPITAL ENCOUNTER (OUTPATIENT)
Dept: BEHAVIORAL HEALTH | Facility: CLINIC | Age: 22
End: 2017-03-15
Attending: PSYCHIATRY & NEUROLOGY
Payer: COMMERCIAL

## 2017-03-15 PROCEDURE — H2012 BEHAV HLTH DAY TREAT, PER HR: HCPCS

## 2017-03-15 PROCEDURE — 97150 GROUP THERAPEUTIC PROCEDURES: CPT | Mod: GO

## 2017-03-16 NOTE — PROGRESS NOTES
"  Adult Mental Health Outpatient Group Therapy Progress Note        Client Initial Individualized Goals for Treatment: \"Decreased isolation. Self-discovery, Increase in confidence, and regaining the ability to make rational life decisions\".     See Initial Treatment suggestions for the client during the time between Diagnostic Assessment and completion of the Master Individualized Treatment Plan.     Treatment Goals: (Next scheduled review: 4.5.17)     1. Client will notify staff when needing assistance to develop or implement a coping plan to manage suicidal or self injurious urges.     2. Dany will use time in OT to identify life balance needs and increase daily structure to look at work/school readiness, Gender therapy, and Completing ADHD evaluation.     3. Dany will use time in group therapy to identify pathway to self discovery and reinforcing boundaries with family.     4. Dany will begin to develop and aftercare plan by finding and attending a Mental Health support group specialized around sexual health and inquiring with her Psychiatrist if individual therapy is done with her or if Dany should find an individual therapist.     Area of Treatment Focus:  Symptom Management, Personal Safety, Community Resources/Discharge Planning, Develop / Improve Independent Living Skills and Develop Socialization / Interpersonal Relationship Skills    Therapeutic Interventions/Treatment Strategies:  Support and Feedback    Response to Treatment Strategies:  Accepted Feedback and Accepted Support    Name of Group:  Group Psychotherapy      Description and Outcome:  Dany shared that her sister visited her again last night.  She enjoys her company.  However, her sister has her own mental health issues that she is struggling with, and often projects this onto Dany.  Dany is concerned for her sister and suggested help for her. Dany is still in conflict with her father surrounding her gender identity. Group facilitator and writer " suggested Dany think more about her gender confusion and whether or not that it is influenced from outside forces or her own struggles looking inward.  Dany took out her phone in group and read text messages that her father had sent her.  This is the way they communicate versus in face or phone calls.  Group facilitator reminded Dany of phone rule in group, as she tends to check her phone regularly in group.  Dany will need to continue working on setting appropriate boundaries with her father.          Is this a Weekly Review of the Progress on the Treatment Plan?  No

## 2017-03-17 ENCOUNTER — HOSPITAL ENCOUNTER (OUTPATIENT)
Dept: BEHAVIORAL HEALTH | Facility: CLINIC | Age: 22
End: 2017-03-17
Attending: PSYCHIATRY & NEUROLOGY
Payer: COMMERCIAL

## 2017-03-17 PROCEDURE — H2012 BEHAV HLTH DAY TREAT, PER HR: HCPCS

## 2017-03-17 PROCEDURE — 97150 GROUP THERAPEUTIC PROCEDURES: CPT | Mod: GO

## 2017-03-17 NOTE — PROGRESS NOTES
"  Adult Mental Health Outpatient Group Therapy Progress Note     Client Initial Individualized Goals for Treatment: \"Decreased isolation. Self-discovery, Increase in confidence, and regaining the ability to make rational life decisions\".    See Initial Treatment suggestions for the client during the time between Diagnostic Assessment and completion of the Master Individualized Treatment Plan.    Treatment Goals: (Next scheduled review: 4.5.17)    1. Client will notify staff when needing assistance to develop or implement a coping plan to manage suicidal or self injurious urges.    2. Dany will use time in OT to identify life balance needs and increase daily structure to look at work/school readiness, Gender therapy, and Completing ADHD evaluation.    3. Dany will use time in group therapy to identify pathway to self discovery and reinforcing boundaries with family.    4. Dany will begin to develop and aftercare plan by finding and attending a Mental Health support group specialized around sexual health and inquiring with her Psychiatrist if individual therapy is done with her or if Dany should find an individual therapist.    Area of Treatment Focus:  Symptom Management, Develop / Improve Independent Living Skills and Develop Socialization / Interpersonal Relationship Skills    Therapeutic Interventions/Treatment Strategies:  Support, Feedback, Safety Assessments, Structured Activity and Problem Solving    Response to Treatment Strategies:  Accepted Feedback, Gave Feedback, Listened, Focused on Goals, Attentive and Accepted Support    Name of Group:  Life Skills     Description and Outcome:  Pt attended and participated in a structured occupational therapy group where intervention focused on coping through structured hands-on activities to improve function in valued roles, routines, and independent living skills. Client had a restricted affect in session. She focused on a familiar individual activity with variable " focus. Interacted minimally with peers in session. Client would benefit from additional opportunities to practice and implement content from this session. Client addressed ITP goal number 2 in this session.    Is this a Weekly Review of the Progress on the Treatment Plan?  No

## 2017-03-20 NOTE — PROGRESS NOTES
"  Adult Mental Health Outpatient Group Therapy Progress Note     Client Initial Individualized Goals for Treatment: \"Decreased isolation. Self-discovery, Increase in confidence, and regaining the ability to make rational life decisions\".      See Initial Treatment suggestions for the client during the time between Diagnostic Assessment and completion of the Master Individualized Treatment Plan.      Treatment Goals: (Next scheduled review: 4.5.17)      1. Client will notify staff when needing assistance to develop or implement a coping plan to manage suicidal or self injurious urges.      2. Dany will use time in OT to identify life balance needs and increase daily structure to look at work/school readiness, Gender therapy, and Completing ADHD evaluation.      3. Dany will use time in group therapy to identify pathway to self discovery and reinforcing boundaries with family.      4. Dnay will begin to develop and aftercare plan by finding and attending a Mental Health support group specialized around sexual health and inquiring with her Psychiatrist if individual therapy is done with her or if Dany should find an individual therapist.     Area of Treatment Focus:  Symptom Management, Personal Safety, Community Resources/Discharge Planning, Develop / Improve Independent Living Skills and Develop Socialization / Interpersonal Relationship Skills    Therapeutic Interventions/Treatment Strategies:  Support, Feedback and Limit/Boundaries    Response to Treatment Strategies:  Accepted Feedback and Accepted Support    Name of Group:  Group Psychotherapy at 9:00     Description and Outcome:  Dany revisited the topic she brought up on Wednesday about her father's communication habits.  She shared that they currently only communicate via phone as in person the meetings can seem confrontational.  Dany has been thinking more of her decision to transition.  One of the main reasons holding her back is the insurance piece.  She " receives insurance through her grandpa's plan. In order to feel more comfortable starting the transition process, Dany would need to find her own healthcare plan.  Dany is currently experiencing a lot of stressors.  She is feeling pressure from her dad to find a job, in which Dany does not think she is quite ready to take that on.  Writer and group facilitator discussed possible support group options Dany could explore related to gender identity.  Dany expressed that an additional group could be very beneficial for her .        Is this a Weekly Review of the Progress on the Treatment Plan?  Yes.      Are Treatment Plan Goals being addressed?  Yes, continue treatment goals      Are Treatment Plan Strategies to Address Goals Effective?  Yes, continue treatment strategies      Are there any current contracts in place?  No

## 2017-03-21 NOTE — PROGRESS NOTES
"  Adult Mental Health Outpatient Group Therapy Progress Note     Client Initial Individualized Goals for Treatment: \"Decreased isolation. Self-discovery, Increase in confidence, and regaining the ability to make rational life decisions\".    See Initial Treatment suggestions for the client during the time between Diagnostic Assessment and completion of the Master Individualized Treatment Plan.    Treatment Goals: (Next scheduled review: 4.5.17)    1. Client will notify staff when needing assistance to develop or implement a coping plan to manage suicidal or self injurious urges.    2. Dany will use time in OT to identify life balance needs and increase daily structure to look at work/school readiness, Gender therapy, and Completing ADHD evaluation.    3. Dany will use time in group therapy to identify pathway to self discovery and reinforcing boundaries with family.    4. Dany will begin to develop and aftercare plan by finding and attending a Mental Health support group specialized around sexual health and inquiring with her Psychiatrist if individual therapy is done with her or if Dany should find an individual therapist.    Area of Treatment Focus:  Symptom Management, Develop / Improve Independent Living Skills and Develop Socialization / Interpersonal Relationship Skills    Therapeutic Interventions/Treatment Strategies:  Support, Feedback, Safety Assessments, Structured Activity and Problem Solving    Response to Treatment Strategies:  Accepted Feedback, Gave Feedback, Listened, Focused on Goals, Attentive and Accepted Support    Name of Group:  Life Skills     Description and Outcome:  Pt attended and participated in a structured occupational therapy group where intervention focused on coping through structured hands-on activities to improve function in valued roles, routines, and independent living skills. Client had a calm, even affect in session. Spent their time focusing on a familiar, relaxing activity. " Reported poor sleep yesterday d/t playing a video game late into the evening. Encouraged client to explore ways to increase healthy structure this weekend. Client accepted handout regarding LGBT support groups. Client would benefit from additional opportunities to practice and implement content from this session. Client addressed ITP goal number 2 in this session.    Is this a Weekly Review of the Progress on the Treatment Plan?  Yes.      Are Treatment Plan Goals being addressed?  Yes, continue treatment goals      Are Treatment Plan Strategies to Address Goals Effective?  Yes, continue treatment strategies      Are there any current contracts in place?  No

## 2017-03-22 ENCOUNTER — HOSPITAL ENCOUNTER (OUTPATIENT)
Dept: BEHAVIORAL HEALTH | Facility: CLINIC | Age: 22
End: 2017-03-22
Attending: PSYCHIATRY & NEUROLOGY
Payer: COMMERCIAL

## 2017-03-22 PROCEDURE — H2012 BEHAV HLTH DAY TREAT, PER HR: HCPCS

## 2017-03-22 PROCEDURE — 97150 GROUP THERAPEUTIC PROCEDURES: CPT | Mod: GO

## 2017-03-23 NOTE — PROGRESS NOTES
"  Adult Mental Health Outpatient Group Therapy Progress Note        Client Initial Individualized Goals for Treatment: \"Decreased isolation. Self-discovery, Increase in confidence, and regaining the ability to make rational life decisions\".      See Initial Treatment suggestions for the client during the time between Diagnostic Assessment and completion of the Master Individualized Treatment Plan.      Treatment Goals: (Next scheduled review: 4.5.17)      1. Client will notify staff when needing assistance to develop or implement a coping plan to manage suicidal or self injurious urges.      2. Dany will use time in OT to identify life balance needs and increase daily structure to look at work/school readiness, Gender therapy, and Completing ADHD evaluation.      3. Dany will use time in group therapy to identify pathway to self discovery and reinforcing boundaries with family.      4. Dany will begin to develop and aftercare plan by finding and attending a Mental Health support group specialized around sexual health and inquiring with her Psychiatrist if individual therapy is done with her or if Dany should find an individual therapist.    Area of Treatment Focus:  Symptom Management, Personal Safety, Community Resources/Discharge Planning, Develop / Improve Independent Living Skills and Develop Socialization / Interpersonal Relationship Skills    Therapeutic Interventions/Treatment Strategies:  Support, Feedback and Limit/Boundaries    Response to Treatment Strategies:  Accepted Feedback, Gave Feedback and Accepted Support    Name of Group:  Group Psychotherapy      Description and Outcome:  Dany shared that her mom disclosed more information about the status of her grandpa in the hospital before Dany's treatment day.  This has been a pattern for Dany and her mom for the last couple of weeks.  Dany reported that she feels her mom relaying this information to her feels like its \"overshadowing\" her coming to " treatment.  and Dany discussed alternative times Dany could suggest to her mom to bring up the seriousness of her grandfather's condition.  Dany and  processed her decision to eventually visit her grandfather in the hospital, and what that might look like to Dany.  Her mom expressed that Dany might not be ready for the state that her grandfather would be in.  Dany and group facilitators discussed her autonomy in making that decision for herself.        Is this a Weekly Review of the Progress on the Treatment Plan?  No

## 2017-03-24 ENCOUNTER — HOSPITAL ENCOUNTER (OUTPATIENT)
Dept: BEHAVIORAL HEALTH | Facility: CLINIC | Age: 22
End: 2017-03-24
Attending: PSYCHIATRY & NEUROLOGY
Payer: COMMERCIAL

## 2017-03-24 PROCEDURE — H2012 BEHAV HLTH DAY TREAT, PER HR: HCPCS

## 2017-03-24 NOTE — PROGRESS NOTES
Psychiatry staffing: case discussed  Diagnosis:  MDD, gender dysphoria, likely ADHD, ADHD eval next week.      Past Medical History:   Diagnosis Date     Depressive disorder      Current Outpatient Prescriptions   Medication     FLUoxetine (PROZAC) 10 MG capsule     Multiple Vitamins-Minerals (MULTIVITAMIN ADULT) CHEW     No current facility-administered medications for this encounter.

## 2017-03-24 NOTE — PROGRESS NOTES
"  Adult Mental Health Outpatient Group Therapy Progress Note     Client Initial Individualized Goals for Treatment: \"Decreased isolation. Self-discovery, Increase in confidence, and regaining the ability to make rational life decisions\".    See Initial Treatment suggestions for the client during the time between Diagnostic Assessment and completion of the Master Individualized Treatment Plan.    Treatment Goals: (Next scheduled review: 4.5.17)    1. Client will notify staff when needing assistance to develop or implement a coping plan to manage suicidal or self injurious urges.    2. Dany will use time in OT to identify life balance needs and increase daily structure to look at work/school readiness, Gender therapy, and Completing ADHD evaluation.    3. Dany will use time in group therapy to identify pathway to self discovery and reinforcing boundaries with family.    4. Dany will begin to develop and aftercare plan by finding and attending a Mental Health support group specialized around sexual health and inquiring with her Psychiatrist if individual therapy is done with her or if Dany should find an individual therapist.    Area of Treatment Focus:  Symptom Management, Develop / Improve Independent Living Skills and Develop Socialization / Interpersonal Relationship Skills    Therapeutic Interventions/Treatment Strategies:  Support, Feedback, Safety Assessments, Structured Activity and Problem Solving    Response to Treatment Strategies:  Accepted Feedback, Gave Feedback, Listened, Focused on Goals, Attentive and Accepted Support    Name of Group:  Life Skills: OT Clinic    Description and Outcome:  Pt attended and participated in a structured occupational therapy group where intervention focused on coping through structured hands-on activities to improve function in valued roles, routines, and independent living skills. Client had a calm, even affect in session. They reported that they have been weighing options " regarding work. Discussed ideas regarding potential work environment. Client stated that attaining a part-time job is part of the agreement made with family to keep current living situation. Plans to explore more concrete job openings following ADHD evaluation next week. Client would benefit from additional opportunities to practice and implement content from this session. Client addressed ITP goal number 2 in this session.    Is this a Weekly Review of the Progress on the Treatment Plan?  No.

## 2017-03-27 ENCOUNTER — HOSPITAL ENCOUNTER (OUTPATIENT)
Dept: BEHAVIORAL HEALTH | Facility: CLINIC | Age: 22
End: 2017-03-27
Attending: PSYCHIATRY & NEUROLOGY
Payer: COMMERCIAL

## 2017-03-27 PROCEDURE — 97150 GROUP THERAPEUTIC PROCEDURES: CPT | Mod: GO

## 2017-03-27 PROCEDURE — H2012 BEHAV HLTH DAY TREAT, PER HR: HCPCS

## 2017-03-27 NOTE — PROGRESS NOTES
"  Adult Mental Health Outpatient Group Therapy Progress Note     Client Initial Individualized Goals for Treatment: \"Decreased isolation. Self-discovery, Increase in confidence, and regaining the ability to make rational life decisions\".      See Initial Treatment suggestions for the client during the time between Diagnostic Assessment and completion of the Master Individualized Treatment Plan.      Treatment Goals: (Next scheduled review: 4.5.17)      1. Client will notify staff when needing assistance to develop or implement a coping plan to manage suicidal or self injurious urges.      2. Dany will use time in OT to identify life balance needs and increase daily structure to look at work/school readiness, Gender therapy, and Completing ADHD evaluation.      3. Dany will use time in group therapy to identify pathway to self discovery and reinforcing boundaries with family.      4. Dany will begin to develop and aftercare plan by finding and attending a Mental Health support group specialized around sexual health and inquiring with her Psychiatrist if individual therapy is done with her or if Dany should find an individual therapist.     Area of Treatment Focus:  Symptom Management, Personal Safety, Community Resources/Discharge Planning, Develop / Improve Independent Living Skills and Develop Socialization / Interpersonal Relationship Skills    Therapeutic Interventions/Treatment Strategies:  Support, Feedback and Limit/Boundaries    Response to Treatment Strategies:  Accepted Feedback and Accepted Support    Name of Group:  Group Psychotherapy     Description and Outcome:  Dany described having insurance problems as she is getting bills in the mail, even though she does not pay the bills herself.  Group talked about ways she could change her address through billing company so that she would no longer receive the bills. Dany's sister visited again and asked her to take her out to eat.  Dany and group facilitator " discussed how her sister's using is impacting time with Dany.      Is this a Weekly Review of the Progress on the Treatment Plan?  Yes.      Are Treatment Plan Goals being addressed?  Yes, continue treatment goals      Are Treatment Plan Strategies to Address Goals Effective?  Yes, continue treatment strategies      Are there any current contracts in place?  No

## 2017-03-28 NOTE — PROGRESS NOTES
"  Adult Mental Health Outpatient Group Therapy Progress Note      Client Initial Individualized Goals for Treatment: \"Decreased isolation. Self-discovery, Increase in confidence, and regaining the ability to make rational life decisions\".      See Initial Treatment suggestions for the client during the time between Diagnostic Assessment and completion of the Master Individualized Treatment Plan.      Treatment Goals: (Next scheduled review: 4.5.17)      1. Client will notify staff when needing assistance to develop or implement a coping plan to manage suicidal or self injurious urges.      2. Dany will use time in OT to identify life balance needs and increase daily structure to look at work/school readiness, Gender therapy, and Completing ADHD evaluation.      3. Dany will use time in group therapy to identify pathway to self discovery and reinforcing boundaries with family.      4. Dany will begin to develop and aftercare plan by finding and attending a Mental Health support group specialized around sexual health and inquiring with her Psychiatrist if individual therapy is done with her or if Dany should find an individual therapist.     Area of Treatment Focus:  Symptom Management, Personal Safety, Community Resources/Discharge Planning, Develop / Improve Independent Living Skills and Develop Socialization / Interpersonal Relationship Skills    Therapeutic Interventions/Treatment Strategies:  Support, Feedback and Limit/Boundaries    Response to Treatment Strategies:  Accepted Feedback and Accepted Support    Name of Group:  Mental Health Management and Group Psychotherapy      Description and Outcome:  The mental health management topic today was on radical acceptance and distressed tolerance. The group talked about how Dany can benefit from both of these skills, as she has been experiencing a lot of stress due to uncontrollable family dynamics.  Dany shared that she gets an allotted amount of money each month " from her grandfather.  Dany reported that her father has access to her bank account which has been an issue for Dany in getting all of her monthly allowance. Writer focused attention to this situation, and other outside factors that are distracting Dany from working on her own well-being while in day treatment. Dany agreed and she shared that she is feeling pulled in multiple directions. Dnay's goal for the upcoming week will be related to setting strict boundaries regarding her finances.  Dany frequently checks her phone during group therapy, even though she is aware of the no phone use rule during group.  If this continues to become an issue, writer will discuss with Dany in private.            Is this a Weekly Review of the Progress on the Treatment Plan?  No

## 2017-03-29 ENCOUNTER — HOSPITAL ENCOUNTER (OUTPATIENT)
Dept: BEHAVIORAL HEALTH | Facility: CLINIC | Age: 22
End: 2017-03-29
Attending: PSYCHIATRY & NEUROLOGY
Payer: COMMERCIAL

## 2017-03-29 PROCEDURE — H2012 BEHAV HLTH DAY TREAT, PER HR: HCPCS

## 2017-03-29 PROCEDURE — 97150 GROUP THERAPEUTIC PROCEDURES: CPT | Mod: GO

## 2017-03-30 NOTE — PROGRESS NOTES
"  Adult Mental Health Outpatient Group Therapy Progress Note     Client Initial Individualized Goals for Treatment: \"Decreased isolation. Self-discovery, Increase in confidence, and regaining the ability to make rational life decisions\".    See Initial Treatment suggestions for the client during the time between Diagnostic Assessment and completion of the Master Individualized Treatment Plan.    Treatment Goals:    1. Client will notify staff when needing assistance to develop or implement a coping plan to manage suicidal or self injurious urges.    2. Dany will use time in OT to identify life balance needs and increase daily structure to look at work/school readiness, Gender therapy, and Completing ADHD evaluation.    3. Dany will use time in group therapy to identify pathway to self discovery and reinforcing boundaries with family.    4. Dany will begin to develop and aftercare plan by finding and attending a Mental Health support group specialized around sexual health and inquiring with her Psychiatrist if individual therapy is done with her or if Dany should find an individual therapist.    Area of Treatment Focus:  Symptom Management, Develop / Improve Independent Living Skills and Develop Socialization / Interpersonal Relationship Skills    Therapeutic Interventions/Treatment Strategies:  Support, Feedback, Safety Assessments, Structured Activity and Problem Solving    Response to Treatment Strategies:  Accepted Feedback, Gave Feedback, Listened, Focused on Goals, Attentive and Accepted Support    Name of Group:  Life Skills    Description and Outcome:  Client attended and participated in a structured life skills group session where intervention focused on identifying the components of occupational life balance, assessing one's life balance, and developing strategies to increase life balance. Client had a calm, even affect in session. Attentive during structured group activity. Participated in turn. Client " verbalized understanding of session content by identifying areas/needs that do not currently feel in balance. Problem solved strategies to increase occupational life balance. Client addressed ITP goal number 2 in this session.    Is this a Weekly Review of the Progress on the Treatment Plan?  No.

## 2017-03-31 ENCOUNTER — HOSPITAL ENCOUNTER (OUTPATIENT)
Dept: BEHAVIORAL HEALTH | Facility: CLINIC | Age: 22
End: 2017-03-31
Attending: PSYCHIATRY & NEUROLOGY
Payer: COMMERCIAL

## 2017-03-31 PROCEDURE — H2012 BEHAV HLTH DAY TREAT, PER HR: HCPCS

## 2017-03-31 PROCEDURE — 97150 GROUP THERAPEUTIC PROCEDURES: CPT | Mod: GO

## 2017-03-31 NOTE — PROGRESS NOTES
"  Adult Mental Health Outpatient Group Therapy Progress Note     Client Initial Individualized Goals for Treatment: \"Decreased isolation. Self-discovery, Increase in confidence, and regaining the ability to make rational life decisions\".    See Initial Treatment suggestions for the client during the time between Diagnostic Assessment and completion of the Master Individualized Treatment Plan.    Treatment Goals:    1. Client will notify staff when needing assistance to develop or implement a coping plan to manage suicidal or self injurious urges.    2. Dany will use time in OT to identify life balance needs and increase daily structure to look at work/school readiness, Gender therapy, and Completing ADHD evaluation.    3. Dany will use time in group therapy to identify pathway to self discovery and reinforcing boundaries with family.    4. Dany will begin to develop and aftercare plan by finding and attending a Mental Health support group specialized around sexual health and inquiring with her Psychiatrist if individual therapy is done with her or if Dany should find an individual therapist.    Area of Treatment Focus:  Symptom Management, Develop / Improve Independent Living Skills and Develop Socialization / Interpersonal Relationship Skills    Therapeutic Interventions/Treatment Strategies:  Support, Feedback, Safety Assessments, Structured Activity and Problem Solving    Response to Treatment Strategies:  Accepted Feedback, Gave Feedback, Listened, Focused on Goals, Attentive and Accepted Support    Name of Group:  Life Skills    Description and Outcome:  Pt attended and participated in a structured occupational therapy group where intervention focused on coping through structured hands-on activities to improve function in valued roles, routines, and independent living skills. Client presented to group with a clam, even affect. Reported increased distress related to interactions with her sister over the past " several ways. Problem solved ways to engage in self care activity during times of distress. Variable task focus. Client would benefit from additional opportunities to practice and implement content from this session. Client addressed ITP goal number 2 in this session.    Is this a Weekly Review of the Progress on the Treatment Plan?  No.

## 2017-03-31 NOTE — PROGRESS NOTES
"  Adult Mental Health Outpatient Group Therapy Progress Note        Client Initial Individualized Goals for Treatment: \"Decreased isolation. Self-discovery, Increase in confidence, and regaining the ability to make rational life decisions\".      See Initial Treatment suggestions for the client during the time between Diagnostic Assessment and completion of the Master Individualized Treatment Plan.      Treatment Goals: (Next scheduled review: 4.5.17)      1. Client will notify staff when needing assistance to develop or implement a coping plan to manage suicidal or self injurious urges.      2. Dany will use time in OT to identify life balance needs and increase daily structure to look at work/school readiness, Gender therapy, and Completing ADHD evaluation.      3. Dany will use time in group therapy to identify pathway to self discovery and reinforcing boundaries with family.      4. Dnay will begin to develop and aftercare plan by finding and attending a Mental Health support group specialized around sexual health and inquiring with her Psychiatrist if individual therapy is done with her or if Dany should find an individual therapist.     Area of Treatment Focus:  Symptom Management, Personal Safety, Community Resources/Discharge Planning, Develop / Improve Independent Living Skills and Develop Socialization / Interpersonal Relationship Skills    Therapeutic Interventions/Treatment Strategies:  Support, Feedback and Limit/Boundaries    Response to Treatment Strategies:  Accepted Feedback and Accepted Support    Name of Group:  Group Psychotherapy      Description and Outcome:  Dany expressed having a \"traumatic\" past couple of days.  Dany's sister is in the hospital due to an accident involving over consumption of alcohol. Dany is very concerned for her sister's well being at the moment.  Group facilitators discussed how Dany is going to balance her feelings surrounding her sister and Dany's own needs.  Dany " recognized that her sister's current state is taking away from her own needs but that she feels guilty if she were not to visit her in the hospital.  Writer and facilitator discussed potential boundaries Dany could set with her sister regarding her accident.       Is this a Weekly Review of the Progress on the Treatment Plan?  No

## 2017-04-03 ENCOUNTER — HOSPITAL ENCOUNTER (OUTPATIENT)
Dept: BEHAVIORAL HEALTH | Facility: CLINIC | Age: 22
End: 2017-04-03
Attending: PSYCHIATRY & NEUROLOGY
Payer: COMMERCIAL

## 2017-04-03 PROCEDURE — H2012 BEHAV HLTH DAY TREAT, PER HR: HCPCS

## 2017-04-03 PROCEDURE — 97150 GROUP THERAPEUTIC PROCEDURES: CPT | Mod: GO

## 2017-04-03 NOTE — PROGRESS NOTES
"  Adult Mental Health Outpatient Group Therapy Progress Note         Client Initial Individualized Goals for Treatment: \"Decreased isolation. Self-discovery, Increase in confidence, and regaining the ability to make rational life decisions\".      See Initial Treatment suggestions for the client during the time between Diagnostic Assessment and completion of the Master Individualized Treatment Plan.      Treatment Goals: (Next scheduled review: 4.5.17)      1. Client will notify staff when needing assistance to develop or implement a coping plan to manage suicidal or self injurious urges.      2. Dany will use time in OT to identify life balance needs and increase daily structure to look at work/school readiness, Gender therapy, and Completing ADHD evaluation.      3. Dany will use time in group therapy to identify pathway to self discovery and reinforcing boundaries with family.      4. Dany will begin to develop and aftercare plan by finding and attending a Mental Health support group specialized around sexual health and inquiring with her Psychiatrist if individual therapy is done with her or if Dany should find an individual therapist.      Area of Treatment Focus:  Symptom Management, Personal Safety, Community Resources/Discharge Planning, Develop / Improve Independent Living Skills and Develop Socialization / Interpersonal Relationship Skills    Therapeutic Interventions/Treatment Strategies:  Support, Feedback and Limit/Boundaries    Response to Treatment Strategies:  Accepted Feedback and Accepted Support    Name of Group:  Mental Health Management and Group Psychotherapy      Description and Outcome:  The mental health management topic was on Maslow's hierarchy of needs.  Group members used outline on the board to express which needs come first in their lives.  Dany spoke to her mental health needs and support of friends being at the top of her list.      Dany shared in group therapy a little of her " "sister's current condition.  Dany is planning an upcoming meeting with her father about her current finances.  Dany shared in group the points she would like to be the included in the discussion. Writer encouraged Dany to keep up her assertive communication skills.  Dany mentioned that she is not used to speaking up for what she wants, and that it feels \"weird\" because her family members are of very successful in the community.        Is this a Weekly Review of the Progress on the Treatment Plan?  No        "

## 2017-04-03 NOTE — PROGRESS NOTES
"  Adult Mental Health Outpatient Group Therapy Progress Note           Client Initial Individualized Goals for Treatment: \"Decreased isolation. Self-discovery, Increase in confidence, and regaining the ability to make rational life decisions\".      See Initial Treatment suggestions for the client during the time between Diagnostic Assessment and completion of the Master Individualized Treatment Plan.      Treatment Goals: (Next scheduled review: 4.5.17)      1. Client will notify staff when needing assistance to develop or implement a coping plan to manage suicidal or self injurious urges.      2. Dany will use time in OT to identify life balance needs and increase daily structure to look at work/school readiness, Gender therapy, and Completing ADHD evaluation.      3. Dany will use time in group therapy to identify pathway to self discovery and reinforcing boundaries with family.      4. Dany will begin to develop and aftercare plan by finding and attending a Mental Health support group specialized around sexual health and inquiring with her Psychiatrist if individual therapy is done with her or if Dany should find an individual therapist.         Area of Treatment Focus:  Symptom Management, Personal Safety, Community Resources/Discharge Planning, Develop / Improve Independent Living Skills and Develop Socialization / Interpersonal Relationship Skills    Therapeutic Interventions/Treatment Strategies:  Support, Feedback and Limit/Boundaries    Response to Treatment Strategies:  Accepted Feedback and Accepted Support    Name of Group:  Group Psychotherapy      Description and Outcome:  Dany's affect appeared worried and somewhat anxious.  She shared that her sister is in the ER with some major injuries.  Dany went into detail about her visit to the ER to see her sister.  This experience was somewhat traumatic for Dany, as her sister kept reminding Dany about the severity of her injuries.  Dany seems very consumed " with her sister's current mental and physical state.  Group facilitator and writer had Dany reflect on how she is handling this, and if this recent event is at all triggering for herself.  Dany shared that she is being somewhat triggered by her sister right now and may need to take a break for self-care.  Dany shared that she has not been sleeping much for the past couple of days.  Group facilitator and writer urged Dany to focus on sleep and eating well over the weekend.      Is this a Weekly Review of the Progress on the Treatment Plan?  No

## 2017-04-05 ENCOUNTER — TELEPHONE (OUTPATIENT)
Dept: BEHAVIORAL HEALTH | Facility: CLINIC | Age: 22
End: 2017-04-05

## 2017-04-05 NOTE — PROGRESS NOTES
"  Adult Mental Health Outpatient Group Therapy Progress Note     Client Initial Individualized Goals for Treatment: \"Decreased isolation. Self-discovery, Increase in confidence, and regaining the ability to make rational life decisions\".    See Initial Treatment suggestions for the client during the time between Diagnostic Assessment and completion of the Master Individualized Treatment Plan.    Treatment Goals:    1. Client will notify staff when needing assistance to develop or implement a coping plan to manage suicidal or self injurious urges.    2. Dany will use time in OT to identify life balance needs and increase daily structure to look at work/school readiness, Gender therapy, and Completing ADHD evaluation.    3. Dany will use time in group therapy to identify pathway to self discovery and reinforcing boundaries with family.    4. Dany will begin to develop and aftercare plan by finding and attending a Mental Health support group specialized around sexual health and inquiring with her Psychiatrist if individual therapy is done with her or if Dany should find an individual therapist.    Area of Treatment Focus:  Symptom Management, Develop / Improve Independent Living Skills and Develop Socialization / Interpersonal Relationship Skills    Therapeutic Interventions/Treatment Strategies:  Support, Feedback, Safety Assessments, Structured Activity and Problem Solving    Response to Treatment Strategies:  Accepted Feedback, Gave Feedback, Listened, Focused on Goals, Attentive and Accepted Support    Name of Group:  [facility]    Description and Outcome:  Pt attended and participated in a structured occupational therapy group where intervention focused on coping through structured hands-on activities to improve function in valued roles, routines, and independent living skills. Client had a calm, even affect in session. Vacillated on activity options. Reported continued interpersonal stress this week regarding " concerns with her sister. Focused on planning an activity to initiate in upcoming session. Client would benefit from additional opportunities to practice and implement content from this session. Client addressed ITP goal number 2 in this session.    Is this a Weekly Review of the Progress on the Treatment Plan?  Yes.      Are Treatment Plan Goals being addressed?  Yes, continue treatment goals      Are Treatment Plan Strategies to Address Goals Effective?  Yes, continue treatment strategies      Are there any current contracts in place?  No

## 2017-04-06 NOTE — PROGRESS NOTES
"  Adult Mental Health Outpatient Group Therapy Progress Note     Client Initial Individualized Goals for Treatment: \"Decreased isolation. Self-discovery, Increase in confidence, and regaining the ability to make rational life decisions\".    See Initial Treatment suggestions for the client during the time between Diagnostic Assessment and completion of the Master Individualized Treatment Plan.    Treatment Goals:    1. Client will notify staff when needing assistance to develop or implement a coping plan to manage suicidal or self injurious urges.    2. Dany will use time in OT to identify life balance needs and increase daily structure to look at work/school readiness, Gender therapy, and Completing ADHD evaluation.    3. Dany will use time in group therapy to identify pathway to self discovery and reinforcing boundaries with family.    4. Dany will begin to develop and aftercare plan by finding and attending a Mental Health support group specialized around sexual health and inquiring with her Psychiatrist if individual therapy is done with her or if aDny should find an individual therapist.    Area of Treatment Focus:  Symptom Management, Develop / Improve Independent Living Skills and Develop Socialization / Interpersonal Relationship Skills    Therapeutic Interventions/Treatment Strategies:  Support, Feedback, Safety Assessments, Structured Activity and Problem Solving    Response to Treatment Strategies:  Accepted Feedback, Gave Feedback, Listened, Focused on Goals, Attentive and Accepted Support    Name of Group:  OT: Life Skills    Description and Outcome:  Client attended and participated in a structured life skills group session where intervention focused on identifying the components of occupational life balance, assessing one's life balance, and developing strategies to increase life balance. Client had a constricted affect in session. Shared that they are meeting with several family members to discuss " several concerns. Client shared that they are anxious about this discussion, but feels that it will help to either know that family is supportive or to help move on if they are not able to provide the support client needs right now. Client verbalized understanding of session content by describing life balance needs for this week. Client addressed ITP goal number 2 in this session.     Is this a Weekly Review of the Progress on the Treatment Plan?  No.

## 2017-04-07 ENCOUNTER — HOSPITAL ENCOUNTER (OUTPATIENT)
Dept: BEHAVIORAL HEALTH | Facility: CLINIC | Age: 22
End: 2017-04-07
Attending: PSYCHIATRY & NEUROLOGY
Payer: COMMERCIAL

## 2017-04-07 PROCEDURE — 97150 GROUP THERAPEUTIC PROCEDURES: CPT | Mod: GO

## 2017-04-07 PROCEDURE — H2012 BEHAV HLTH DAY TREAT, PER HR: HCPCS

## 2017-04-10 NOTE — PROGRESS NOTES
"  Adult Mental Health Outpatient Group Therapy Progress Note     Client Initial Individualized Goals for Treatment: \"Decreased isolation. Self-discovery, Increase in confidence, and regaining the ability to make rational life decisions\".      See Initial Treatment suggestions for the client during the time between Diagnostic Assessment and completion of the Master Individualized Treatment Plan.      Treatment Goals: (Next scheduled review: 4.5.17)      1. Client will notify staff when needing assistance to develop or implement a coping plan to manage suicidal or self injurious urges.      2. Dany will use time in OT to identify life balance needs and increase daily structure to look at work/school readiness, Gender therapy, and Completing ADHD evaluation.      3. Dany will use time in group therapy to identify pathway to self discovery and reinforcing boundaries with family.      4. Dany will begin to develop and aftercare plan by finding and attending a Mental Health support group specialized around sexual health and inquiring with her Psychiatrist if individual therapy is done with her or if Dany should find an individual therapist.     Area of Treatment Focus:  Symptom Management, Personal Safety, Community Resources/Discharge Planning, Develop / Improve Independent Living Skills and Develop Socialization / Interpersonal Relationship Skills    Therapeutic Interventions/Treatment Strategies:  Support and Feedback    Response to Treatment Strategies:  Accepted Feedback, Gave Feedback and Accepted Support    Name of Group:  Group Psychotherapy      Description and Outcome:  Dany shared with the group that she is looking for possible employment through [employer].  She will be having a meeting with her dad in three weeks.  In the meantime, group facilitators and Dany discussed possible goals she could work on.  Dany agreed that uncontrollable stressors, particularly financial and family dynamics, are taking away " from focus on her own goals of self discovery.  aDny will look into a LGBT support group, as this could provide added support Dany is needing in this transitional phase of her life.      Is this a Weekly Review of the Progress on the Treatment Plan?  Yes.      Are Treatment Plan Goals being addressed?  Yes, continue treatment goals      Are Treatment Plan Strategies to Address Goals Effective?  Yes, continue treatment strategies      Are there any current contracts in place?  No

## 2017-04-11 NOTE — PROGRESS NOTES
"  Adult Mental Health Outpatient Group Therapy Progress Note     Client Initial Individualized Goals for Treatment: \"Decreased isolation. Self-discovery, Increase in confidence, and regaining the ability to make rational life decisions\".    See Initial Treatment suggestions for the client during the time between Diagnostic Assessment and completion of the Master Individualized Treatment Plan.    Treatment Goals:    1. Client will notify staff when needing assistance to develop or implement a coping plan to manage suicidal or self injurious urges.    2. Dany will use time in OT to identify life balance needs and increase daily structure to look at work/school readiness, Gender therapy, and Completing ADHD evaluation.    3. Dany will use time in group therapy to identify pathway to self discovery and reinforcing boundaries with family.    4. Dany will begin to develop and aftercare plan by finding and attending a Mental Health support group specialized around sexual health and inquiring with her Psychiatrist if individual therapy is done with her or if Dany should find an individual therapist.    Area of Treatment Focus:  Symptom Management, Develop / Improve Independent Living Skills and Develop Socialization / Interpersonal Relationship Skills    Therapeutic Interventions/Treatment Strategies:  Support, Feedback, Safety Assessments, Structured Activity and Problem Solving    Response to Treatment Strategies:  Accepted Feedback, Gave Feedback, Listened, Focused on Goals, Attentive and Accepted Support    Name of Group:  OT [facility]    Description and Outcome:  Pt attended and participated in a structured occupational therapy group where intervention focused on coping through structured hands-on activities to improve function in valued roles, routines, and independent living skills. Client presented to group with a calm, even affect. They initiated a goal focused, creativity based activity with one verbal cue from " writer. They attended to activity with fair task focus. Attentive to details of the task. Organized with task setup, execution, and cleanup. Interacted on and off with a peer in group. Client demonstrated understanding of session content by initiating a novel task in session. Client addressed ITP goal number 2 in this session.     Is this a Weekly Review of the Progress on the Treatment Plan?  Yes.      Are Treatment Plan Goals being addressed?  Yes, continue treatment goals      Are Treatment Plan Strategies to Address Goals Effective?  Yes, continue treatment strategies      Are there any current contracts in place?  No

## 2017-04-12 ENCOUNTER — HOSPITAL ENCOUNTER (OUTPATIENT)
Dept: BEHAVIORAL HEALTH | Facility: CLINIC | Age: 22
End: 2017-04-12
Attending: PSYCHIATRY & NEUROLOGY
Payer: COMMERCIAL

## 2017-04-12 PROCEDURE — H2012 BEHAV HLTH DAY TREAT, PER HR: HCPCS

## 2017-04-12 PROCEDURE — 97150 GROUP THERAPEUTIC PROCEDURES: CPT | Mod: GO

## 2017-04-13 NOTE — PROGRESS NOTES
"  Adult Mental Health Outpatient Group Therapy Progress Note     Client Initial Individualized Goals for Treatment: \"Decreased isolation. Self-discovery, Increase in confidence, and regaining the ability to make rational life decisions\".      See Initial Treatment suggestions for the client during the time between Diagnostic Assessment and completion of the Master Individualized Treatment Plan.      Treatment Goals: (Next scheduled review: 4.5.17)      1. Client will notify staff when needing assistance to develop or implement a coping plan to manage suicidal or self injurious urges.      2. Dany will use time in OT to identify life balance needs and increase daily structure to look at work/school readiness, Gender therapy, and Completing ADHD evaluation.      3. Dany will use time in group therapy to identify pathway to self discovery and reinforcing boundaries with family.      4. Dany will begin to develop and aftercare plan by finding and attending a Mental Health support group specialized around sexual health and inquiring with her Psychiatrist if individual therapy is done with her or if Dany should find an individual therapist.     Area of Treatment Focus:  Symptom Management, Personal Safety, Community Resources/Discharge Planning, Develop / Improve Independent Living Skills and Develop Socialization / Interpersonal Relationship Skills    Therapeutic Interventions/Treatment Strategies:  Support and Feedback    Response to Treatment Strategies:  Accepted Feedback and Accepted Support    Name of Group:  Group Psychotherapy     Description and Outcome:  Dany shared that she visited her sister in rehab.  She described the experience as overwhelming. Dany's family is discussing visiting Dany's sister over [facility].  Dany does not think this will be a good idea for her mental state.  Dany used assertive communication in telling her family that she would not be able to attend if they choose to go the rehab center in " where her sister is at to [place].  Dany reported her sleeping habits increasing.  She attributes this to the stressful situation with her dad and sister.  Dany had her treatment planning meeting today.  She will plan on working toward a path to self- discovery in group psychotherapy, and finding an appropriate support group.       Is this a Weekly Review of the Progress on the Treatment Plan?  No

## 2017-04-14 ENCOUNTER — HOSPITAL ENCOUNTER (OUTPATIENT)
Dept: BEHAVIORAL HEALTH | Facility: CLINIC | Age: 22
End: 2017-04-14
Attending: PSYCHIATRY & NEUROLOGY
Payer: COMMERCIAL

## 2017-04-14 PROCEDURE — H2012 BEHAV HLTH DAY TREAT, PER HR: HCPCS

## 2017-04-14 PROCEDURE — 97150 GROUP THERAPEUTIC PROCEDURES: CPT | Mod: GO

## 2017-04-14 NOTE — PROGRESS NOTES
"  Adult Mental Health Outpatient Group Therapy Progress Note     Client Initial Individualized Goals for Treatment: \"Decreased isolation. Self-discovery, Increase in confidence, and regaining the ability to make rational life decisions\".      See Initial Treatment suggestions for the client during the time between Diagnostic Assessment and completion of the Master Individualized Treatment Plan.      Treatment Goals: (Next scheduled review: 17)      1. Client will notify staff when needing assistance to develop or implement a coping plan to manage suicidal or self injurious urges.      2. Dany will use time in OT to identify life balance needs and increase daily structure to look at work/school readiness, Gender therapy, and Completing ADHD evaluation.      3. Dany will use time in group therapy to identify pathway to self discovery and reinforcing boundaries with family.      4. Dany will begin to develop and aftercare plan by finding and attending a Mental Health support group specialized around sexual health and inquiring with her Psychiatrist if individual therapy is done with her or if Dany should find an individual therapist.     Area of Treatment Focus:  Symptom Management, Personal Safety, Community Resources/Discharge Planning, Develop / Improve Independent Living Skills and Develop Socialization / Interpersonal Relationship Skills    Therapeutic Interventions/Treatment Strategies:  Support and Feedback    Response to Treatment Strategies:  Accepted Feedback and Accepted Support    Name of Group:  Group Psychotherapy     Description and Outcome:  Dany shared that she is very disappointed in her sister because she let Dany's rats die.  Out of four, three of Dany's rats  in the sister's care.  She is now taking care of the last one who is also having seizures and not eating that well.  Dany seemed very overwhelmed by this development with her rats.  There continues to be lots of loss and disappointment " in her family system generally.  She is going to struggle with ever trusting her sister again.         Is this a Weekly Review of the Progress on the Treatment Plan?  No

## 2017-04-14 NOTE — PROGRESS NOTES
"  Adult Mental Health Outpatient Group Therapy Progress Note     Client Initial Individualized Goals for Treatment: \"Decreased isolation. Self-discovery, Increase in confidence, and regaining the ability to make rational life decisions\".    See Initial Treatment suggestions for the client during the time between Diagnostic Assessment and completion of the Master Individualized Treatment Plan.    Treatment Goals:    1. Client will notify staff when needing assistance to develop or implement a coping plan to manage suicidal or self injurious urges.    2. Dany will use time in OT to identify life balance needs and increase daily structure to look at work/school readiness, Gender therapy, and Completing ADHD evaluation.    3. Dany will use time in group therapy to identify pathway to self discovery and reinforcing boundaries with family.    4. Dany will begin to develop and aftercare plan by finding and attending a Mental Health support group specialized around sexual health and inquiring with her Psychiatrist if individual therapy is done with her or if Dany should find an individual therapist.    Area of Treatment Focus:  Symptom Management, Develop / Improve Independent Living Skills and Develop Socialization / Interpersonal Relationship Skills    Therapeutic Interventions/Treatment Strategies:  Support, Feedback, Safety Assessments, Structured Activity and Problem Solving    Response to Treatment Strategies:  Accepted Feedback, Gave Feedback, Listened, Focused on Goals, Attentive and Accepted Support    Name of Group:  [facility]    Description and Outcome:  Pt attended and participated in a structured occupational therapy group where intervention focused on coping through structured hands-on activities to improve function in valued roles, routines, and independent living skills. Client presented to group with a calm, even affect. Self directed with ongoing goal focused task in session. Fair task focus. Interacted " periodically with peers in session. Continues to consider support groups, individual therapist, and problem solving ADHD evaluation barriers. Client demonstrated understanding of session content by initiating task i'ly. Client addressed ITP goal number 2 in this session.    Is this a Weekly Review of the Progress on the Treatment Plan?  No.

## 2017-04-17 ENCOUNTER — HOSPITAL ENCOUNTER (OUTPATIENT)
Dept: BEHAVIORAL HEALTH | Facility: CLINIC | Age: 22
End: 2017-04-17
Attending: PSYCHIATRY & NEUROLOGY
Payer: COMMERCIAL

## 2017-04-17 PROCEDURE — H2012 BEHAV HLTH DAY TREAT, PER HR: HCPCS

## 2017-04-17 PROCEDURE — 97150 GROUP THERAPEUTIC PROCEDURES: CPT | Mod: GO

## 2017-04-19 NOTE — PROGRESS NOTES
"  Adult Mental Health Outpatient Group Therapy Progress Note     Client Initial Individualized Goals for Treatment: \"Decreased isolation. Self-discovery, Increase in confidence, and regaining the ability to make rational life decisions\".    See Initial Treatment suggestions for the client during the time between Diagnostic Assessment and completion of the Master Individualized Treatment Plan.    Treatment Goals:    1. Client will notify staff when needing assistance to develop or implement a coping plan to manage suicidal or self injurious urges.    2. Dany will use time in OT to identify life balance needs and increase daily structure to look at work/school readiness, Gender therapy, and Completing ADHD evaluation.    3. Dany will use time in group therapy to identify pathway to self discovery and reinforcing boundaries with family.    4. Dany will begin to develop and aftercare plan by finding and attending a Mental Health support group specialized around sexual health and inquiring with her Psychiatrist if individual therapy is done with her or if Dany should find an individual therapist.    Area of Treatment Focus:  Symptom Management, Develop / Improve Independent Living Skills and Develop Socialization / Interpersonal Relationship Skills    Therapeutic Interventions/Treatment Strategies:  Support, Feedback, Safety Assessments, Structured Activity and Problem Solving    Response to Treatment Strategies:  Accepted Feedback, Gave Feedback, Listened, Focused on Goals, Attentive and Accepted Support    Name of Group:  OT [facility]    Description and Outcome:  Pt attended and participated in a structured occupational therapy group where intervention focused on coping through structured hands-on activities to improve function in valued roles, routines, and independent living skills. Client had a restricted affect in session. They required verbal cue to initiate activity. Variable task focus throughout session. Client " interacted minimally with others during session. Worked on problem solving barriers to completing ADHD evaluation. Client would benefit from additional opportunities to practice and implement content from this session. Client addressed ITP goal number 2 in this session.    Is this a Weekly Review of the Progress on the Treatment Plan?  Yes.      Are Treatment Plan Goals being addressed?  Yes, continue treatment goals      Are Treatment Plan Strategies to Address Goals Effective?  Yes, continue treatment strategies      Are there any current contracts in place?  No

## 2017-04-20 NOTE — PROGRESS NOTES
"  Adult Mental Health Outpatient Group Therapy Progress Note     Client Initial Individualized Goals for Treatment: \"Decreased isolation. Self-discovery, Increase in confidence, and regaining the ability to make rational life decisions\".    See Initial Treatment suggestions for the client during the time between Diagnostic Assessment and completion of the Master Individualized Treatment Plan.    Treatment Goals:    1. Client will notify staff when needing assistance to develop or implement a coping plan to manage suicidal or self injurious urges.    2. Dany will use time in OT to identify life balance needs and increase daily structure to look at work/school readiness, Gender therapy, and Completing ADHD evaluation.    3. Dany will use time in group therapy to identify pathway to self discovery and reinforcing boundaries with family.    4. Dany will begin to develop and aftercare plan by finding and attending a Mental Health support group specialized around sexual health and inquiring with her Psychiatrist if individual therapy is done with her or if Dany should find an individual therapist.    Area of Treatment Focus:  Symptom Management, Develop / Improve Independent Living Skills and Develop Socialization / Interpersonal Relationship Skills    Therapeutic Interventions/Treatment Strategies:  Support, Feedback, Safety Assessments, Structured Activity and Problem Solving    Response to Treatment Strategies:  Accepted Feedback, Gave Feedback, Listened, Focused on Goals, Attentive and Accepted Support    Name of Group:  OT Skills    Description and Outcome:  Pt attended and participated in a structured life skills group session where intervention focused on sensory system education, strategies to modulate arousal level, and coping through the senses for improved functioning in daily routines and other meaningful tasks. Client presented to group with a calm, even affect. Fair task focus during structured individual " activity related to identifying bothersome sensory experiences. Client would benefit from additional opportunities to practice and implement content from this session. Client addressed ITP goal number 2 in this session.     Is this a Weekly Review of the Progress on the Treatment Plan?  No.

## 2017-04-21 ENCOUNTER — HOSPITAL ENCOUNTER (OUTPATIENT)
Dept: BEHAVIORAL HEALTH | Facility: CLINIC | Age: 22
End: 2017-04-21
Attending: PSYCHIATRY & NEUROLOGY
Payer: COMMERCIAL

## 2017-04-21 PROCEDURE — H2012 BEHAV HLTH DAY TREAT, PER HR: HCPCS

## 2017-04-21 PROCEDURE — 97150 GROUP THERAPEUTIC PROCEDURES: CPT | Mod: GO

## 2017-04-21 NOTE — PROGRESS NOTES
Psychiatry staffing: case discussed  Diagnosis:  MDD, gender dysphoria, stress with parents.  Working on ADHD evaluation.      Current Outpatient Prescriptions   Medication     FLUoxetine (PROZAC) 10 MG capsule     Multiple Vitamins-Minerals (MULTIVITAMIN ADULT) CHEW     No current facility-administered medications for this encounter.      Past Medical History:   Diagnosis Date     Depressive disorder

## 2017-04-24 NOTE — PROGRESS NOTES
"  Adult Mental Health Outpatient Group Therapy Progress Note     Client Initial Individualized Goals for Treatment: \"Decreased isolation. Self-discovery, Increase in confidence, and regaining the ability to make rational life decisions\".      See Initial Treatment suggestions for the client during the time between Diagnostic Assessment and completion of the Master Individualized Treatment Plan.      Treatment Goals: (Next scheduled review: 4.5.17)      1. Client will notify staff when needing assistance to develop or implement a coping plan to manage suicidal or self injurious urges.      2. Dany will use time in OT to identify life balance needs and increase daily structure to look at work/school readiness, Gender therapy, and Completing ADHD evaluation.      3. Dany will use time in group therapy to identify pathway to self discovery and reinforcing boundaries with family.      4. Dany will begin to develop and aftercare plan by finding and attending a Mental Health support group specialized around sexual health and inquiring with her Psychiatrist if individual therapy is done with her or if Dany should find an individual therapist.      Area of Treatment Focus:  Symptom Management and Develop Socialization / Interpersonal Relationship Skills     Therapeutic Interventions/Treatment Strategies:  Limit/Boundaries, Structured Activity and Problem Solving     Response to Treatment Strategies:  Accepted Feedback and Gave Feedback     Name of Group: Mental Health Management     Description and Outcome, Mental Health Management:  Patient participated in a structured activity on personal boundaries. Psychoeducation was provided focused on differentiating between rigid, porous, and healthy boundaries, and identifying different types of boundaries/domains. The patient did an exercise involving identifying an individual with whom they have boundary issues, characterizing the issues, and planning ways to improve boundaries. " The patient was an active participant and shared their findings with the group.     Is this a Weekly Review of the Progress on the Treatment Plan?  No

## 2017-04-24 NOTE — PROGRESS NOTES
"  Adult Mental Health Outpatient Group Therapy Progress Note     Client Initial Individualized Goals for Treatment: \"Decreased isolation. Self-discovery, Increase in confidence, and regaining the ability to make rational life decisions\".     See Initial Treatment suggestions for the client during the time between Diagnostic Assessment and completion of the Master Individualized Treatment Plan.     Treatment Goals:     1. Client will notify staff when needing assistance to develop or implement a coping plan to manage suicidal or self injurious urges.     2. Dany will use time in OT to identify life balance needs and increase daily structure to look at work/school readiness, Gender therapy, and Completing ADHD evaluation.     3. Dany will use time in group therapy to identify pathway to self discovery and reinforcing boundaries with family.     4. Dany will begin to develop and aftercare plan by finding and attending a Mental Health support group specialized around sexual health and inquiring with her Psychiatrist if individual therapy is done with her or if Dany should find an individual therapist.     Area of Treatment Focus:  Symptom Management, Personal Safety, Community Resources/Discharge Planning, Develop / Improve Independent Living Skills and Develop Socialization / Interpersonal Relationship Skills    Therapeutic Interventions/Treatment Strategies:  Support and Feedback    Response to Treatment Strategies:  Accepted Feedback and Accepted Support    Name of Group:  Group Psychotherapy      Description and Outcome:  Dany described having a rough couple of days.  She went out to eat with her mom and her boyfriend.  During the outing, both mom and boyfriend would call her by both her birth given name and the name she used to identify with when thinking more seriously about transitioning.  Dany shared that she has expressed to them what name she wants to be called and that she gets upset when they do not follow " this in conversations.  Dany is still undecided about whether or not she wants to move forward with her transition.         Is this a Weekly Review of the Progress on the Treatment Plan?  Yes.      Are Treatment Plan Goals being addressed?  Yes, continue treatment goals      Are Treatment Plan Strategies to Address Goals Effective?  Yes, continue treatment strategies      Are there any current contracts in place?  No

## 2017-04-24 NOTE — PROGRESS NOTES
"  Adult Mental Health Outpatient Group Therapy Progress Note     Client Initial Individualized Goals for Treatment: \"Decreased isolation. Self-discovery, Increase in confidence, and regaining the ability to make rational life decisions\".    See Initial Treatment suggestions for the client during the time between Diagnostic Assessment and completion of the Master Individualized Treatment Plan.    Treatment Goals:    1. Client will notify staff when needing assistance to develop or implement a coping plan to manage suicidal or self injurious urges.    2. Dany will use time in OT to identify life balance needs and increase daily structure to look at work/school readiness, Gender therapy, and Completing ADHD evaluation.    3. Dany will use time in group therapy to identify pathway to self discovery and reinforcing boundaries with family.    4. Dany will begin to develop and aftercare plan by finding and attending a Mental Health support group specialized around sexual health and inquiring with her Psychiatrist if individual therapy is done with her or if Dany should find an individual therapist.    Area of Treatment Focus:  Symptom Management, Develop / Improve Independent Living Skills and Develop Socialization / Interpersonal Relationship Skills    Therapeutic Interventions/Treatment Strategies:  Support, Feedback, Safety Assessments, Structured Activity and Problem Solving    Response to Treatment Strategies:  Accepted Feedback, Gave Feedback, Listened, Focused on Goals, Attentive and Accepted Support    Name of Group:  OT Clinic    Description and Outcome:  Pt attended and participated in a structured occupational therapy group where intervention focused on coping through structured hands-on activities to improve function in valued roles, routines, and independent living skills. Client had a calm, even affect in session. Needed verbal cue to initiate ongoing multi step task. Variable task focus. Socialized " periodically with several peers in group. Client would benefit from additional opportunities to practice and implement content from this session. Client addressed ITP goal number 2 in this session.    Is this a Weekly Review of the Progress on the Treatment Plan?  Yes.      Are Treatment Plan Goals being addressed?  Yes, continue treatment goals      Are Treatment Plan Strategies to Address Goals Effective?  Yes, continue treatment strategies      Are there any current contracts in place?  No

## 2017-04-28 ENCOUNTER — TELEPHONE (OUTPATIENT)
Dept: BEHAVIORAL HEALTH | Facility: CLINIC | Age: 22
End: 2017-04-28

## 2017-04-28 NOTE — TELEPHONE ENCOUNTER
Writer called and spoke with client as she missed treatment groups this week, client reported she has been searching for a new apartment this week and plans to return next week.

## 2017-05-01 ENCOUNTER — HOSPITAL ENCOUNTER (OUTPATIENT)
Dept: BEHAVIORAL HEALTH | Facility: CLINIC | Age: 22
End: 2017-05-01
Attending: PSYCHIATRY & NEUROLOGY
Payer: COMMERCIAL

## 2017-05-01 PROCEDURE — H2012 BEHAV HLTH DAY TREAT, PER HR: HCPCS

## 2017-05-01 PROCEDURE — 97150 GROUP THERAPEUTIC PROCEDURES: CPT | Mod: GO

## 2017-05-01 NOTE — PROGRESS NOTES
Group Therapy Progress Notes     Area of Treatment Focus:  Symptom Management, Develop / Improve Independent Living Skills and Develop Socialization / Interpersonal Relationship Skills    Therapeutic Interventions/Treatment Strategies:  Support, Feedback, Clarification and Education    Response to Treatment Strategies:  Accepted Feedback, Gave Feedback, Listened, Focused on Goals, Attentive, Accepted Support and Alert    Name of Group:  Psychotherapy     Progress Note  Dany reports that she handled a situation with her father in a way that she felt positive about. FA didn't show for a meeting with Dany after rescheduling several times previously.  The topic of the meeting was to be Dany's return to school and finances.  has more recently sent emails saying that he wants Dany out of her apt by June 30. 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 Dany says she is feeling good about being assertive and being clear with her father about her feelings and responses to his threats. She is unsure how this will play out and is aware that she may have to find another place to live and work. She received feedback from peers and therapist.          Is this a Weekly Review of the Progress on the Treatment Plan?  No

## 2017-05-02 NOTE — PROGRESS NOTES
"  Adult Mental Health Outpatient Group Therapy Progress Note     Client Initial Individualized Goals for Treatment: \"Decreased isolation. Self-discovery, Increase in confidence, and regaining the ability to make rational life decisions\".    See Initial Treatment suggestions for the client during the time between Diagnostic Assessment and completion of the Master Individualized Treatment Plan.    Treatment Goals:    1. Client will notify staff when needing assistance to develop or implement a coping plan to manage suicidal or self injurious urges.    2. Dany will use time in OT to identify life balance needs and increase daily structure to look at work/school readiness, Gender therapy, and Completing ADHD evaluation.    3. Dany will use time in group therapy to identify pathway to self discovery and reinforcing boundaries with family.    4. Dany will begin to develop and aftercare plan by finding and attending a Mental Health support group specialized around sexual health and inquiring with her Psychiatrist if individual therapy is done with her or if Dany should find an individual therapist.    Area of Treatment Focus:  Symptom Management, Develop / Improve Independent Living Skills and Develop Socialization / Interpersonal Relationship Skills    Therapeutic Interventions/Treatment Strategies:  Support, Feedback, Safety Assessments, Structured Activity and Problem Solving    Response to Treatment Strategies:  Accepted Feedback, Gave Feedback, Listened, Focused on Goals, Attentive and Accepted Support    Name of Group:  OT Skills    Description and Outcome:  Pt attended and participated in a structured life skills group session where intervention focused on sensory system education, strategies to modulate arousal level, and coping through the senses for improved functioning in daily routines and other meaningful tasks. Client had a bright affect in session. Attentive during discussion focused on identifying level of " arousal to increase awareness of self regulation. Reports improved ability to regulate arousal level since getting a new fidget. Client would benefit from additional opportunities to practice and implement content from this session. Client addressed ITP goal number 2 in this session.     Is this a Weekly Review of the Progress on the Treatment Plan?  No.

## 2017-05-05 ENCOUNTER — HOSPITAL ENCOUNTER (OUTPATIENT)
Dept: BEHAVIORAL HEALTH | Facility: CLINIC | Age: 22
End: 2017-05-05
Attending: PSYCHIATRY & NEUROLOGY
Payer: COMMERCIAL

## 2017-05-05 PROCEDURE — H2012 BEHAV HLTH DAY TREAT, PER HR: HCPCS

## 2017-05-05 PROCEDURE — 97150 GROUP THERAPEUTIC PROCEDURES: CPT | Mod: GO

## 2017-05-05 NOTE — PROGRESS NOTES
"Adult Mental Health Outpatient Group Therapy Progress Note         Client Initial Individualized Goals for Treatment: \"Decreased isolation. Self-discovery, Increase in confidence, and regaining the ability to make rational life decisions\".      See Initial Treatment suggestions for the client during the time between Diagnostic Assessment and completion of the Master Individualized Treatment Plan.      Treatment Goals:      1. Client will notify staff when needing assistance to develop or implement a coping plan to manage suicidal or self injurious urges.      2. Dany will use time in OT to identify life balance needs and increase daily structure to look at work/school readiness, Gender therapy, and Completing ADHD evaluation.      3. Dany will use time in group therapy to identify pathway to self discovery and reinforcing boundaries with family.      4. Dany will begin to develop and aftercare plan by finding and attending a Mental Health support group specialized around sexual health and inquiring with her Psychiatrist if individual therapy is done with her or if Dany should find an individual therapist.      Area of Treatment Focus:  Symptom Management, Personal Safety, Community Resources/Discharge Planning, Develop / Improve Independent Living Skills and Develop Socialization / Interpersonal Relationship Skills     Therapeutic Interventions/Treatment Strategies:  Support and Feedback     Response to Treatment Strategies:  Accepted Feedback and Accepted Support     Name of Group: Group Psychotherapy       Description and Outcome:  Dany described having a rough couple of days. She was supposed to meet with her father this week but then he cancelled on her again.  She decided to stay calm but then also responded to him with a text.  She did ask him about why he was responding in this way to her.  She has not heard from him yet.  She knew her life would be changing by   September if she did not do want he saw fit " but now he is moving up the pressure on her.  She has not pursued any therapy options even though she was given a list of resources weeks ago.  She is thinking about changing psychiatrist but does not have a new one in mind.     Is this a Weekly Review of the Progress on the Treatment Plan?  No.      Are Treatment Plan Goals being addressed?  Yes, continue treatment goals        Are Treatment Plan Strategies to Address Goals Effective?  Yes, continue treatment strategies        Are there any current contracts in place?  No

## 2017-05-10 ENCOUNTER — HOSPITAL ENCOUNTER (OUTPATIENT)
Dept: BEHAVIORAL HEALTH | Facility: CLINIC | Age: 22
End: 2017-05-10
Attending: PSYCHIATRY & NEUROLOGY
Payer: COMMERCIAL

## 2017-05-10 PROCEDURE — 97150 GROUP THERAPEUTIC PROCEDURES: CPT | Mod: GO

## 2017-05-10 PROCEDURE — H2012 BEHAV HLTH DAY TREAT, PER HR: HCPCS

## 2017-05-12 NOTE — PROGRESS NOTES
"  Adult Mental Health Outpatient Group Therapy Progress Note     Client Initial Individualized Goals for Treatment: \"Decreased isolation. Self-discovery, Increase in confidence, and regaining the ability to make rational life decisions\".    See Initial Treatment suggestions for the client during the time between Diagnostic Assessment and completion of the Master Individualized Treatment Plan.    Treatment Goals:    1. Client will notify staff when needing assistance to develop or implement a coping plan to manage suicidal or self injurious urges.    2. Dany will use time in OT to identify life balance needs and increase daily structure to look at work/school readiness, Gender therapy, and Completing ADHD evaluation.    3. Dany will use time in group therapy to identify pathway to self discovery and reinforcing boundaries with family.    4. Dany will begin to develop and aftercare plan by finding and attending a Mental Health support group specialized around sexual health and inquiring with her Psychiatrist if individual therapy is done with her or if Dany should find an individual therapist.    Area of Treatment Focus:  Symptom Management, Develop / Improve Independent Living Skills and Develop Socialization / Interpersonal Relationship Skills    Therapeutic Interventions/Treatment Strategies:  Support, Feedback, Safety Assessments, Structured Activity and Problem Solving    Response to Treatment Strategies:  Accepted Feedback, Gave Feedback, Listened, Focused on Goals, Attentive and Accepted Support    Name of Group:  OT Clinic    Description and Outcome:  Pt attended and participated in a structured occupational therapy group where intervention focused on coping through structured hands-on activities to improve function in valued roles, routines, and independent living skills. Client had a calm, even affect. She discussed pressing need to find a job and find new housing. Focused on planning steps towards " accomplishing this goal. Organized throughout participation in task. Made a plan to schedule appointments with several providers to secure aftercare plans. Client demonstrated understanding of session content by resilient attitude about current situation. Client addressed ITP goal number 2 in this session.     Is this a Weekly Review of the Progress on the Treatment Plan?  No.

## 2017-05-19 NOTE — DISCHARGE SUMMARY
Adult Mental Health Intensive Outpatient Discharge Summary/Instructions      Patient: Hana Naegele MRN: [id number]   : 1995 Age: 22 year old Sex: female     Admission Date: 2/15/17  Discharge Date: 17  Diagnosis: 296.33 Major Depressive Disorder, Recurrent Episode, Severe _ and With anxious distress   302.6 Gender Dysphoria    Focus of Treatment / Progress    Personal Safety: Dany experienced self harm thoughts during participation in day treatment. She actively used skills to manage thoughts and has a plan to seek out supports or use ED if needed for SI/SIB.     * Follow your safety plan     * Call crisis lines as needed:    [facility] [phone] [facility] [phone]  [facility] [phone] Crisis Connection [phone]  [facility] [phone] [facility] [phone]  [facility] [phone] National Suicide Prevention [phone]  [facility] [phone] Suicide Prevention [phone]  [facility] [phone]    Managing symptoms of:  During group therapy Dany focused on exploring concepts of self discovery and also worked on reinforcing boundaries with her family. Dany focused in occupational therapy groups on identifying life balance needs and increasing daily structure. She also worked on exploring options for housing and part time employment.    Community support/health:  Dany worked during participation in day treatment program on completing an ADHD evaluation. She explored needs and interests regarding support groups related to gender identity. She considered options for psychiatry and individual therapy.    Managing Symptoms and Preventing Relapse    * Go to all of your appointments    * Take all medications as directed.      * Carry a current list if medication with you    * Do not use illicit (street) drugs.  Avoid alcohol    * Report these symptoms to your care team. These are early signs of relapse:   Thoughts of suicide   Losing more  sleep or excessive daytime sleeping   Increased confusion   Mood getting worse   Feeling more aggressive   Other:  Withdrawing or Isolating to yourself.    *Use these skills daily:  Focus on self care, reach out to support people (friends, family) schedule an activity out of the house, continue to set boundaries with others.    Copy of summary sent to: Dr. Faith    Follow up with psychiatrist / main caregiver: Dr Zaida Faith, [place] [phone]     Next visit:  Client needs to schedule follow up.    Follow up with your therapist: Dany has been given resources for individual therapy and was encouraged to call to set up an appt. at one of these locations:    [facility] [employer] [facility] [employer] [address] [place] [address] [address] [place], [address]  Phone: [phone]    [facility]  [address] [place] [address] [phone]    Go to group therapy and / or support groups at:   [facility]  [facility]  Every Saturday from 1:00 pm to 2:30 pm  [facility]  [address], [place] [address]  Contact: [place]  [phone]    See your medical doctor about:  Any medical concerns that arise.    Other:  Your team appreciates the opportunity of working with you and wishes you the best of luck. Please call with any follow up questions [phone].    Client Signature:_______________________  Date / Time:___________  Staff Signature:________________________   Date / Time:___________

## 2020-01-07 ENCOUNTER — TRANSFERRED RECORDS (OUTPATIENT)
Dept: HEALTH INFORMATION MANAGEMENT | Facility: CLINIC | Age: 25
End: 2020-01-07

## 2020-07-29 ENCOUNTER — TRANSFERRED RECORDS (OUTPATIENT)
Dept: HEALTH INFORMATION MANAGEMENT | Facility: CLINIC | Age: 25
End: 2020-07-29

## 2021-05-14 DIAGNOSIS — R35.0 URINARY FREQUENCY: ICD-10-CM

## 2021-05-14 DIAGNOSIS — R39.15 URGENCY OF URINATION: Primary | ICD-10-CM

## 2021-11-29 ENCOUNTER — TELEPHONE (OUTPATIENT)
Dept: PLASTIC SURGERY | Facility: CLINIC | Age: 26
End: 2021-11-29
Payer: COMMERCIAL

## 2021-11-29 DIAGNOSIS — F64.0 GENDER DYSPHORIA IN ADOLESCENT AND ADULT: Primary | ICD-10-CM

## 2021-11-29 NOTE — TELEPHONE ENCOUNTER
[facility] :  Care Coordination Note     SITUATION   Pt (Payam, he/him) is a 26 year old adult who is receiving support for:  New Patient (New Gender Care)  .    BACKGROUND     Scheduled pt for top surgery consult with Flakito 2/2/22.     ASSESSMENT     Surgery              [facility] Assessment  [facility] ([facility] Enrollment: Enrolled  Patient has a therapist: No  Letter of support #1: Requested  Surgery being considered: Yes  Mastectomy: Yes    Pt reports:    No smoking  No diabetes  HRT since July 2019  No previous gender affirming surgeries      PLAN          Nursing Interventions:  [facility] assessment completed    Follow-up plan:    1. Obtain EMILE Feliciano

## 2021-11-29 NOTE — TELEPHONE ENCOUNTER
[facility] Call Center    Phone Message    May a detailed message be left on voicemail: yes     Reason for Call: Other: New patient // Top Surgery consult // FTM // preferred name is Payam // self referral // prefers he-him pronouns // please call to schedule     Action Taken: Message routed to:  [facility]    Travel Screening: Not Applicable

## 2021-11-30 NOTE — TELEPHONE ENCOUNTER
FUTURE VISIT INFORMATION      FUTURE VISIT INFORMATION:    Date: 2/22/22    Time: 2:00pm    Location: [facility]  REFERRAL INFORMATION:    Referring provider:  self    Referring providers clinic:  N/A    Reason for visit/diagnosis  Top consult    RECORDS REQUESTED FROM:       No recs to collect

## 2021-12-04 ENCOUNTER — HEALTH MAINTENANCE LETTER (OUTPATIENT)
Age: 26
End: 2021-12-04

## 2022-01-14 ENCOUNTER — TRANSFERRED RECORDS (OUTPATIENT)
Dept: HEALTH INFORMATION MANAGEMENT | Facility: CLINIC | Age: 27
End: 2022-01-14
Payer: COMMERCIAL

## 2022-02-01 ENCOUNTER — MEDICAL CORRESPONDENCE (OUTPATIENT)
Dept: HEALTH INFORMATION MANAGEMENT | Facility: CLINIC | Age: 27
End: 2022-02-01
Payer: COMMERCIAL

## 2022-02-02 ENCOUNTER — PRE VISIT (OUTPATIENT)
Dept: PLASTIC SURGERY | Facility: CLINIC | Age: 27
End: 2022-02-02

## 2022-02-02 ENCOUNTER — OFFICE VISIT (OUTPATIENT)
Dept: PLASTIC SURGERY | Facility: CLINIC | Age: 27
End: 2022-02-02
Payer: COMMERCIAL

## 2022-02-02 VITALS
DIASTOLIC BLOOD PRESSURE: 86 MMHG | WEIGHT: 177.1 LBS | BODY MASS INDEX: 28.46 KG/M2 | HEART RATE: 99 BPM | SYSTOLIC BLOOD PRESSURE: 144 MMHG | HEIGHT: 66 IN | OXYGEN SATURATION: 97 %

## 2022-02-02 DIAGNOSIS — F64.0 GENDER DYSPHORIA IN ADOLESCENT AND ADULT: ICD-10-CM

## 2022-02-02 PROCEDURE — 99203 OFFICE O/P NEW LOW 30 MIN: CPT | Performed by: STUDENT IN AN ORGANIZED HEALTH CARE EDUCATION/TRAINING PROGRAM

## 2022-02-02 RX ORDER — TESTOSTERONE 1.62 MG/G
GEL TRANSDERMAL EVERY EVENING
COMMUNITY
Start: 2021-12-29

## 2022-02-02 ASSESSMENT — MIFFLIN-ST. JEOR: SCORE: 1565.07

## 2022-02-02 ASSESSMENT — PAIN SCALES - GENERAL: PAINLEVEL: NO PAIN (0)

## 2022-02-02 NOTE — LETTER
Date:February 17, 2022      Patient was self referred, no letter generated. Do not send.        [facility] Health Information

## 2022-02-02 NOTE — NURSING NOTE
"Chief Complaint   Patient presents with     Consult     gender affirming top surgery       Vitals:    02/02/22 1401   BP: (!) 144/86   Pulse: 99   SpO2: 97%   Weight: 80.3 kg (177 lb 1.6 oz)   Height: 1.676 m (5' 6\")       Body mass index is 28.58 kg/m .          BALDOMERO BENITEZ EMT    "

## 2022-02-02 NOTE — PROGRESS NOTES
"PRS    HPI: 26-year-old transmale presenting for chest masculinization consult.  Patient has been socially transitioning since 2013, but on testosterone for 2-1/2 years.  Patient has been chest binding, but not every day.  Patient is working with a therapist and has a letter of support that was sent but not yet uploaded.  Patient denies any nipple discharge, masses or lumps, or armpit swelling.  Patient had a mammogram on 14 January, and brings a report today.  He denies any nipple discharge, masses or lumps, or armpit swelling.  No prior breast or chest surgery.  Patient does not smoke or use nicotine products.  He lives in the [facility], and has a partner of 10 years along with good supportive friends.  Patient has the goal of flattening the chest to help with dysphoria and is curious about whether he is a candidate for limited incision techniques.    ROS: Negative, see HPI  Past medical history: Gender dysphoria, depression  Past surgical history: No prior surgeries to the chest or breast  Medications: Testosterone, Prozac  Allergies: Penicillins (hives and dyspnea)  Social history: Non-smoker, denies any tobacco or nicotine use  Family history: No bleeding or clotting problems, or issues with anesthesia    Examination:  BP (!) 144/86   Pulse 99   Ht 1.676 m (5' 6\")   Wt 80.3 kg (177 lb 1.6 oz)   SpO2 97%   BMI 28.58 kg/m    Nonlabored breathing  Not distressed  No nipple discharge, masses or lumps, skin changes, or axillary lymphadenopathy bilaterally  Left breast is larger  Bilateral grade 2 ptotic breasts  Breast measurements:  Sternal notch nipple: 25 cm on the left, 23 cm on the right  Breast base width: 14.5 cm on the left, 16 cm on the right  Nipple inframammary distance: 8 cm on the left, 7.5 cm on the right  Nipple midline distance: 11 cm on the left, 11.5 cm on the right  Areolar diameter: 4 cm bilaterally    Mammogram 1/14/2022: BIRADS-1, negative    A/P: 26-year-old transmale presenting for " chest masculinization consult    -Patient is a good candidate for bilateral double incision mastectomies with nipple areolar skin grafting. Showed the patient example photographs of results and patient is happy with this type of operative plan. Discussed the risks of surgery, including but not limited to: Infection, bleeding, pain, poor scarring, hematoma, seroma, asymmetries, need for revision surgery, nipple areolar skin graft loss or depigmentation, persistent lateral chest rolls, decreased sensation to the chest.  Despite these risks, patient consents to and would like to proceed with scheduling of bilateral double incision mastectomies with nipple areolar skin grafting.  -Report of screening mammography to be uploaded  -Letter support to be uploaded  -Once letter support is uploaded, we will place case request  -A total of 30 minutes was devoted to review of chart, direct face-to-face patient counseling and documentation during this encounter    Leonid Fraga MD, PhD

## 2022-02-02 NOTE — LETTER
"2/2/2022       RE: Hana R Naegele  [address]  [place] [address]     Dear Colleague,    Thank you for referring your patient, Hana R Naegele, to the [facility] [place] [facility]. Please see a copy of my visit note below.    PRS    HPI: 26-year-old transmale presenting for chest masculinization consult.  Patient has been socially transitioning since 2013, but on testosterone for 2-1/2 years.  Patient has been chest binding, but not every day.  Patient is working with a therapist and has a letter of support that was sent but not yet uploaded.  Patient denies any nipple discharge, masses or lumps, or armpit swelling.  Patient had a mammogram on 14 January, and brings a report today.  He denies any nipple discharge, masses or lumps, or armpit swelling.  No prior breast or chest surgery.  Patient does not smoke or use nicotine products.  He lives in the [facility], and has a partner of 10 years along with good supportive friends.  Patient has the goal of flattening the chest to help with dysphoria and is curious about whether he is a candidate for limited incision techniques.    ROS: Negative, see HPI  Past medical history: Gender dysphoria, depression  Past surgical history: No prior surgeries to the chest or breast  Medications: Testosterone, Prozac  Allergies: Penicillins (hives and dyspnea)  Social history: Non-smoker, denies any tobacco or nicotine use  Family history: No bleeding or clotting problems, or issues with anesthesia    Examination:  BP (!) 144/86   Pulse 99   Ht 1.676 m (5' 6\")   Wt 80.3 kg (177 lb 1.6 oz)   SpO2 97%   BMI 28.58 kg/m    Nonlabored breathing  Not distressed  No nipple discharge, masses or lumps, skin changes, or axillary lymphadenopathy bilaterally  Left breast is larger  Bilateral grade 2 ptotic breasts  Breast measurements:  Sternal notch nipple: 25 cm on the left, 23 " cm on the right  Breast base width: 14.5 cm on the left, 16 cm on the right  Nipple inframammary distance: 8 cm on the left, 7.5 cm on the right  Nipple midline distance: 11 cm on the left, 11.5 cm on the right  Areolar diameter: 4 cm bilaterally    Mammogram 1/14/2022: BIRADS-1, negative    A/P: 26-year-old transmale presenting for chest masculinization consult    -Patient is a good candidate for bilateral double incision mastectomies with nipple areolar skin grafting. Showed the patient example photographs of results and patient is happy with this type of operative plan. Discussed the risks of surgery, including but not limited to: Infection, bleeding, pain, poor scarring, hematoma, seroma, asymmetries, need for revision surgery, nipple areolar skin graft loss or depigmentation, persistent lateral chest rolls, decreased sensation to the chest.  Despite these risks, patient consents to and would like to proceed with scheduling of bilateral double incision mastectomies with nipple areolar skin grafting.  -Report of screening mammography to be uploaded  -Letter support to be uploaded  -Once letter support is uploaded, we will place case request  -A total of 30 minutes was devoted to review of chart, direct face-to-face patient counseling and documentation during this encounter    Leonid Fraga MD, PhD      Again, thank you for allowing me to participate in the care of your patient.      Sincerely,    Leonid Fraga MD

## 2022-02-04 ENCOUNTER — TELEPHONE (OUTPATIENT)
Dept: PLASTIC SURGERY | Facility: CLINIC | Age: 27
End: 2022-02-04
Payer: COMMERCIAL

## 2022-02-04 NOTE — TELEPHONE ENCOUNTER
[facility] :  Care Coordination Note     SITUATION   Payam is a 26 year old adult who is receiving support for:  No chief complaint on file.  .    BACKGROUND     Pt sent in LOS. Due to insurance, pt needs diagnostic assessment from  provider as well. Writer let pt know about requirement.     ASSESSMENT     Surgery              CGC Assessment  [employer] (C[employer]) Enrollment: (P) Enrolled  Patient has a therapist: (P) Yes  Letter of support #1: (P) Received  Letter #1 Date: (P) 02/04/22  Surgery being considered: (P) Yes  Mastectomy: (P) Yes          PLAN          Nursing Interventions:      Follow-up plan:    1. Obtain DA, schedule surgery       Angelic Feliciano

## 2022-03-01 ENCOUNTER — CARE COORDINATION (OUTPATIENT)
Dept: PLASTIC SURGERY | Facility: CLINIC | Age: 27
End: 2022-03-01
Payer: COMMERCIAL

## 2022-03-01 ENCOUNTER — TELEPHONE (OUTPATIENT)
Dept: PLASTIC SURGERY | Facility: CLINIC | Age: 27
End: 2022-03-01
Payer: COMMERCIAL

## 2022-03-01 NOTE — TELEPHONE ENCOUNTER
[facility] :  Care Coordination Note     SITUATION   Vic Naegele is a 26 year old adult who is receiving support for:  No chief complaint on file.  .    BACKGROUND     Reviewed LOS. Changes made look good. RNCCs to review mammogram and submit orders    ASSESSMENT     Surgery              CGC Assessment  [employer] (CGC) Enrollment: (P) Enrolled  Patient has a therapist: (P) Yes  Letter of support #1: (P) Received  Letter #1 Date: (P) 02/14/22  Surgery being considered: (P) Yes  Mastectomy: (P) Yes  Diagnostic Assessment: Received    PLAN          Nursing Interventions:  @FLOW([id number]::1:2)@    Follow-up plan:  RnCCs to review mammogram and submit orders       FLOR WILKERSON PA-C

## 2022-03-04 PROBLEM — F64.0 GENDER DYSPHORIA IN ADOLESCENT AND ADULT: Status: ACTIVE | Noted: 2022-03-04

## 2022-03-04 NOTE — ADDENDUM NOTE
Addended by: JOSEPHINE HERNANDEZ on: 3/4/2022 08:24 AM     Modules accepted: Silvestre, SmartSet

## 2022-03-08 NOTE — TELEPHONE ENCOUNTER
FUTURE VISIT INFORMATION      SURGERY INFORMATION:    Date: 4/19/22    Location:  OR    Surgeon:  Leonid Fraga MD    Anesthesia Type:  general    Procedure: BILATERAL DOUBLE-INCISION MASTECTOMIES WITH FREE NIPPLE-AREOLAR SKIN GRAFTING    Consult: ov 2/2    RECORDS REQUESTED FROM:       Pertinent Medical History: None

## 2022-03-09 DIAGNOSIS — Z11.59 ENCOUNTER FOR SCREENING FOR OTHER VIRAL DISEASES: Primary | ICD-10-CM

## 2022-04-11 ENCOUNTER — OFFICE VISIT (OUTPATIENT)
Dept: SURGERY | Facility: CLINIC | Age: 27
End: 2022-04-11
Payer: COMMERCIAL

## 2022-04-11 ENCOUNTER — ANESTHESIA EVENT (OUTPATIENT)
Dept: SURGERY | Facility: AMBULATORY SURGERY CENTER | Age: 27
End: 2022-04-11
Payer: COMMERCIAL

## 2022-04-11 ENCOUNTER — PRE VISIT (OUTPATIENT)
Dept: SURGERY | Facility: CLINIC | Age: 27
End: 2022-04-11

## 2022-04-11 VITALS
OXYGEN SATURATION: 96 % | HEART RATE: 84 BPM | HEIGHT: 66 IN | SYSTOLIC BLOOD PRESSURE: 121 MMHG | BODY MASS INDEX: 28.12 KG/M2 | WEIGHT: 175 LBS | DIASTOLIC BLOOD PRESSURE: 84 MMHG | RESPIRATION RATE: 16 BRPM | TEMPERATURE: 98.4 F

## 2022-04-11 DIAGNOSIS — F64.0 GENDER DYSPHORIA IN ADOLESCENT AND ADULT: ICD-10-CM

## 2022-04-11 DIAGNOSIS — Z01.818 PREOP EXAMINATION: Primary | ICD-10-CM

## 2022-04-11 PROCEDURE — 99203 OFFICE O/P NEW LOW 30 MIN: CPT | Performed by: PHYSICIAN ASSISTANT

## 2022-04-11 RX ORDER — MULTIPLE VITAMINS W/ MINERALS TAB 9MG-400MCG
1 TAB ORAL EVERY MORNING
COMMUNITY
End: 2023-07-12

## 2022-04-11 RX ORDER — ASCORBIC ACID 100 MG
TABLET,CHEWABLE ORAL EVERY MORNING
COMMUNITY
End: 2023-07-12

## 2022-04-11 ASSESSMENT — LIFESTYLE VARIABLES: TOBACCO_USE: 0

## 2022-04-11 ASSESSMENT — PAIN SCALES - GENERAL: PAINLEVEL: NO PAIN (0)

## 2022-04-11 NOTE — H&P
Pre-Operative H & P     CC:  Preoperative exam to assess for increased cardiopulmonary risk while undergoing surgery and anesthesia.    Date of Encounter: 4/11/2022  Primary Care Physician:  No Ref-Primary, Physician     Reason for visit:   Encounter Diagnoses   Name Primary?     Preop examination Yes     Gender dysphoria in adolescent and adult        HPI  Hana R Naegele is a 26 year old adult who presents for pre-operative H & P in preparation for  Procedure Information     Case: [id number] Date/Time: 04/19/22 1250    Procedure: BILATERAL DOUBLE-INCISION MASTECTOMIES WITH FREE NIPPLE-AREOLAR SKIN GRAFTING (Bilateral Breast)    Anesthesia type: General    Diagnosis: Gender dysphoria in adolescent and adult [F64.0]    Pre-op diagnosis: Gender dysphoria in adolescent and adult [F64.0]    Location: [place] [address] / [facility]    Providers: Leonid Fraga MD          This is a 26 year old transgender male with PMH significant for depression.  He wishes to proceed with the above surgery to better align his physical features with identity.  He binds intermittently and has been transitioning since approximately 2013.  He denies any cardiac history or complications with prior anesthesia.    History is obtained from the patient and chart review    Hx of abnormal bleeding or anti-platelet use: denies    Menstrual history: No LMP recorded. Patient has had an injection.:  Depo-estradiol injection q 3 months    Past Medical History  Past Medical History:   Diagnosis Date     Depressive disorder        Past Surgical History  Past Surgical History:   Procedure Laterality Date     ENT SURGERY      ear tubes     wisdom teeth removed in 08/16         Prior to Admission Medications  Current Outpatient Medications   Medication Sig Dispense Refill     Ascorbic Acid (VITAMIN C) 100 MG CHEW Take by mouth every morning       BLACK ELDERBERRY PO Take by mouth every morning        cholecalciferol 25 MCG (1000 UT) TABS Take by mouth every morning       estradiol cypionate (DEPO-ESTRADIOL) 5 MG/ML injection Inject into the muscle every 3 months       multivitamin w/minerals (MULTI-VITAMIN) tablet Take 1 tablet by mouth every morning Occasionally       testosterone (ANDROGEL 1.62 % PUMP) 20.25 MG/ACT gel every evening       vitamin B-12 (CYANOCOBALAMIN) 100 MCG tablet Take 1,000 mcg by mouth every morning         Allergies  Allergies   Allergen Reactions     Penicillins Anaphylaxis       Social History  Social History     Socioeconomic History     Marital status: Single     Spouse name: Not on file     Number of children: Not on file     Years of education: Not on file     Highest education level: Not on file   Occupational History     Not on file   Tobacco Use     Smoking status: Never Smoker     Smokeless tobacco: Never Used   Substance and Sexual Activity     Alcohol use: Not on file     Drug use: Not on file     Sexual activity: Not on file   Other Topics Concern     Parent/sibling w/ CABG, MI or angioplasty before 65F 55M? Not Asked   Social History Narrative     Not on file     Social Determinants of Health     Financial Resource Strain: Not on file   Food Insecurity: Not on file   Transportation Needs: Not on file   Physical Activity: Not on file   Stress: Not on file   Social Connections: Not on file   Intimate Partner Violence: Not on file   Housing Stability: Not on file       Family History  Family History   Problem Relation Age of Onset     Anxiety Disorder Mother      Cancer Mother      Cancer Maternal Grandfather      Cancer Paternal Grandfather        Review of Systems  The complete review of systems is negative other than noted in the HPI or here.       Anesthesia Evaluation   Pt has had prior anesthetic.     No history of anesthetic complications       ROS/MED HX  ENT/Pulmonary:  - neg pulmonary ROS  (-) tobacco use, asthma and sleep apnea   Neurologic:  - neg neurologic ROS    "  Cardiovascular:  - neg cardiovascular ROS     METS/Exercise Tolerance: >4 METS    Hematologic:  - neg hematologic  ROS  (-) history of blood clots and history of blood transfusion   Musculoskeletal:  - neg musculoskeletal ROS     GI/Hepatic:  - neg GI/hepatic ROS     Renal/Genitourinary:  - neg Renal ROS     Endo:  - neg endo ROS     Psychiatric/Substance Use:     (+) psychiatric history     Infectious Disease: Comment: covid positive 1/7/22, not admitted      Malignancy:  - neg malignancy ROS     Other:  - neg other ROS          /84 (BP Location: Right arm, Patient Position: Chair, Cuff Size: Adult Regular)   Pulse 84   Temp 98.4  F (36.9  C) (Oral)   Resp 16   Ht 1.676 m (5' 6\")   Wt 79.4 kg (175 lb)   SpO2 96%   Breastfeeding No   BMI 28.25 kg/m      Physical Exam   Constitutional: Awake, alert, cooperative, no apparent distress, and appears stated age.  Eyes: Pupils equal, round and reactive to light, extra ocular muscles intact, sclera clear, conjunctiva normal.  HENT: Normocephalic, oral pharynx with moist mucus membranes, good dentition. No goiter appreciated.   Respiratory: Clear to auscultation bilaterally, no crackles or wheezing.  Cardiovascular: Regular rate and rhythm, normal S1 and S2, and no murmur noted.  No edema. Palpable pulses to radial and PT arteries.   GI: Not assessed  Lymph/Hematologic: No cervical lymphadenopathy and no supraclavicular lymphadenopathy.  Genitourinary:  deferred  Skin: Warm and dry.   Musculoskeletal: Full ROM of neck. There is no redness, warmth, or swelling of the joints. Gross motor strength is normal.    Neurologic: Awake, alert, oriented to name, place and time. Cranial nerves II-XII are grossly intact. Gait is normal.   Neuropsychiatric: Calm, cooperative. Normal affect.     Prior Labs/Diagnostic Studies   All labs and imaging personally reviewed         EKG/ stress test - if available please see in ROS above       The patient's records and results " "personally reviewed by this provider.     Outside records reviewed from: No outside records available        Assessment      Hana R Naegele is a 26 year old adult seen as a PAC referral for risk assessment and optimization for anesthesia.    Plan/Recommendations  Pt will be optimized for the proposed procedure.  See below for details on the assessment, risk, and preoperative recommendations    NEUROLOGY  - No history of TIA, CVA or seizure  - Post Op delirium risk factors:  No risk identified    ENT  - No current airway concerns.  Will need to be reassessed day of surgery.  Mallampati: I  TM: > 3    CARDIAC  - No history of CAD, Hypertension and Afib  - METS (Metabolic Equivalents)  Patient performs 4 or more METS exercise without symptoms            Total Score: 0      RCRI-Very low risk: Class 1 0.4% complication rate  *This score is based   on incomplete data *           Total Score: 0*        Criteria with incomplete data:    RCRI: Cardiac Risks         PULMONARY    LEIGH Low Risk            Total Score: 1    LEIGH: Male      - Denies asthma or inhaler use  - Tobacco History      History   Smoking Status     Never Smoker   Smokeless Tobacco     Never Used       GI    PONV High Risk  Total Score: 3           1 AN PONV: Pt is Female    1 AN PONV: Patient is not a current smoker    1 AN PONV: Intended Post Op Opioids        ENDOCRINE    - BMI: Estimated body mass index is 28.25 kg/m  as calculated from the following:    Height as of this encounter: 1.676 m (5' 6\").    Weight as of this encounter: 79.4 kg (175 lb).  Overweight (BMI 25.0-29.9)  - No history of Diabetes Mellitus    HEME  VTE Low Risk 0.5%            Total Score: 2    VTE: Male      - No history of abnormal bleeding or antiplatelet use.      PSYCH  - PTSD, pt sees therapist twice weekly.     OTHER  - pt reports PCN allergy with airway issues and hives as a child.  Dr. Schaefer sent staff message to Dr. Vera to try and arrange for anceph testing.  Will " alert surgeon if cephalosporin can be given.    ADDENDUM: pt saw Dr. Vera 4/18/22.  Per his note:    # ruling out Penicillin allergy (as child reaction?)  ? In prick and intradermal tests no signs for immediate type reaction to Penicillin G, Ampicillin and Ceftriaxone (Cephalosporine)  >> based on skin tests and the hx (with Amoxicillin well tolerated) no signs for specific immediate type reaction to Penicillins     ==> Treatment Plan:  >> in skin prick and intradermal tests no signs for specific immediate type reaction to Penicillin G, Ampicillin and Ceftriaxone. I would propose to use tomorrow Cephalosporines. However, if Penicillins are necessary I would give them tomorrow as well  >> the only part we cannot evaluate is a possible delayed type reaction because timing too short. But patient will observe the next 2-3 days the arms to rule out delayed reaction and if any reaction take photos        The patient is optimized for their procedure. AVS with information on surgery time/arrival time, meds and NPO status given by nursing staff. No further diagnostic testing indicated.      On the day of service:     Prep time: 10 minutes  Visit time: 10 minutes  Documentation time: 10 minutes  ------------------------------------------  Total time: 30 minutes      Heidy Boyer PA-C  Preoperative Assessment Center  [facility]  Clinic and Surgery Center  Phone: [phone]  Fax: [phone]

## 2022-04-11 NOTE — PATIENT INSTRUCTIONS
Preparing for Your Surgery      Name:  Hana R Naegele   MRN:  [id number]   :  1995   Today's Date:  2022         Arriving for surgery:  Surgery date:  22  Arrival time:  11:20 am    Restrictions due to COVID 19:    Effective 2022  1 visitor may accompany patient and wait in the surgery waiting room  All visitors must wear a mask and social distance      parking is available for anyone with mobility limitations or disabilities. (Monday- Friday 7 am- 5 pm)    Please come to:    [facility]  [address] [place] [phone]    Please check in on the 5th floor at the Ambulatory Surgery Center       What can I eat or drink?    -  You may eat and drink normally until 8 hours before surgery. (Until 4:50 am)  -  You may have clear liquids up to 4 hours before surgery. (Until 8:50 am)  Examples of clear liquids:  Water  Clear broth  Juices (apple, white grape, white cranberry  and cider) without pulp  Noncarbonated, powder based beverages  (lemonade and Quan-Aid)  Sodas (Sprite, 7-Up, ginger ale and seltzer)  Coffee or tea (without milk or cream)  Gatorade    --No alcohol for at least 24 hours before surgery    Which medicines can I take?    Hold Aspirin for 7 days before surgery.    Hold Multivitamins for 7 days before surgery. Take the last Multivitamin on 22 and then hold until surgery.  Hold Supplements for 7 days before surgery. Take the last Black Elderberry on 22 and then hold until surgery.  Hold Ibuprofen (Advil, Motrin) for 1 day before surgery--unless otherwise directed by surgeon.  Hold Naproxen (Aleve) for 4 days before surgery.    -  DO NOT take the following medications the day of surgery:  Vitamin C, Vitamin B-12, Vitamin D3,     -  PLEASE TAKE the following medications the day of surgery   Acetaminophen (Tylenol) if needed    How do I prepare myself?  - Please take 2 showers before surgery using Scrubcare or Hibiclens soap.    Use this  soap only from the neck to your toes.     Leave the soap on your skin for one minute--then rinse thoroughly.      You may use your own shampoo and conditioner; no other hair products.   - Please remove all jewelry and body piercings.  - No lotions, deodorants or fragrance.  - Bring your ID and insurance card.    -If you have a Deep Brain Stimulator, a Spinal Cord Stimulator or any implanted Neuro Device you must bring the remote to the Surgery Center         - All patients are required to have a Covid-19 test within 4 days of surgery/procedure.      -Patients will be contacted by the [facility] scheduling team within 1 week of surgery to make an appointment.      - Patients may call the Scheduling team at [phone] if they have not been scheduled within 4 days of  surgery.      ALL PATIENTS ARE REQUIRED TO HAVE A RESPONSIBLE ADULT TO DRIVE AND BE IN ATTENDANCE WITH THEM FOR 24 HOURS FOLLOWING SURGERY       Questions or Concerns:    -For questions regarding the day of surgery please contact the Ambulatory Surgery Center at [phone].    -If you have health changes between today and your surgery please contact your surgeon.     For questions after surgery please call your surgeons office.

## 2022-04-13 ENCOUNTER — OFFICE VISIT (OUTPATIENT)
Dept: PLASTIC SURGERY | Facility: CLINIC | Age: 27
End: 2022-04-13
Payer: COMMERCIAL

## 2022-04-13 VITALS
HEART RATE: 86 BPM | SYSTOLIC BLOOD PRESSURE: 139 MMHG | HEIGHT: 66 IN | DIASTOLIC BLOOD PRESSURE: 88 MMHG | OXYGEN SATURATION: 97 % | WEIGHT: 173 LBS | BODY MASS INDEX: 27.8 KG/M2

## 2022-04-13 DIAGNOSIS — F64.0 GENDER DYSPHORIA IN ADOLESCENT AND ADULT: Primary | ICD-10-CM

## 2022-04-13 PROCEDURE — 99212 OFFICE O/P EST SF 10 MIN: CPT | Performed by: STUDENT IN AN ORGANIZED HEALTH CARE EDUCATION/TRAINING PROGRAM

## 2022-04-13 RX ORDER — CALCIUM CARBONATE 300MG(750)
2.5 TABLET,CHEWABLE ORAL DAILY
COMMUNITY
End: 2023-07-12

## 2022-04-13 ASSESSMENT — PAIN SCALES - GENERAL: PAINLEVEL: NO PAIN (0)

## 2022-04-13 NOTE — TELEPHONE ENCOUNTER
FUTURE VISIT INFORMATION      FUTURE VISIT INFORMATION:    Date: 4.18.22    Time: 12:00    [place] [facility] [place]FORMATION:    Referring provider:  Rosalind    Referring providers clinic:  PAC    Reason for visit/diagnosis  PCN Allergy - Surgery 4/19/2022    RECORDS REQUESTED FROM:       Clinic name Comments Records Status   PAC 4.11.22  [facility]

## 2022-04-13 NOTE — PATIENT INSTRUCTIONS
Bilateral Mastectomy   Pre and Post op Surgery Instructions    You are scheduled for top surgery with nipple grafts on 4/19/22 at 1:00 PM    You will need to arrive at 11:00 AM  [facility] [address] [place] [id number]    Please check in on the 5th floor.    - Please make sure you have someone to drive you home after your surgery and you will need someone to stay with you at home 24 hours after your surgery.    The surgery will be about 3-4 hours long. You will be in recovery afterwards for about 1-2 hours.     Before Surgery:  - 8 hours prior to surgery stop eating solid food. You can continue to drink clear liquids (water, apple juice, Gatorade) up to 2 hours before surgery. If you have any medications that need to be taken in this timeframe, you can take them with a small sip of water    - No Ibuprofen, Advil, Aleve, Naproxen, Motrin, Aspirin for 7 days prior to surgery. If you are having pain in the week before surgery Tylenol is okay to take.    - Wear or bring a button up or zip up shirt with you to the hospital.    - Wash with surgical soap:    Showering with an antiseptic soap prior to an invasive procedure will decrease the  bacteria that is normally found on the skin.   Using 1/2 of the bottle for each application.  Be sure to use the whole bottle before coming to surgery.  The evening before surgery, shower or bathe as you normally would, using your preferred soap.  Give special attention to areas where the incisions will be made. Rinse thoroughly.  You may wash your hair with your regular shampoo.   Next wash your entire body, from the chin down, with the special antiseptic soap (full strength) using a freshly laundered clean washcloth, again giving special attention to areas where incisions will be made.  Rinse thoroughly and dry off using a freshly laundered clean towel.   Wear clean clothes/pajamas and sleep on clean sheets  Repeat steps 2 and 3 in the morning before  coming to surgery (using the rest of the bottle of soap).     **If you have a reaction to the surgical soap, let the nurse know.     Events of day:     -Check in on the 3rd floor of the hospital for your surgery.    Pre-op     - After you check in, you will meet with a pre-op nurse. They will go over your medications, review your H&P (pre-op physical), have you change into a paper gown, and your chest will be wiped again with soap.    - They will place compression boots on both legs to help decrease risk of blood clots.     - You may be asked to complete a pregnancy test. If you have had no exposure to sperm, you can decline.    - You will meet with the anesthesiologists and nurse anesthetist who will be keeping you asleep during surgery, they will be placing an IV, and will be monitoring you throughout the surgery.    - You will meet with the RN as you are being moved into operating room, they will be helping to position you and keep you comfortable during the surgery.     - Dr. Newby will meet you in the pre-op area to discuss any questions about surgery, make markings on your chest for planning, and to sign your consent.     Intra-op (OR)    - A catheter will be placed in your bladder after you are sleeping and is typically removed before you wake up. The longest this would typically be in is in the post-op recovery room if needed.     - There will be straps and pillows to help position you and keep you in place during the surgery.    - The tissue that is removed will be sent to pathology to be analyzed and then discarded.     - BRADLEY drains will be placed (one in each armpit) and a pain catheter will be placed in the center of your chest.     - Closure is done with dissolvable sutures under the skin and is then sealed with glue, and tape.    Post-op/Recovery    - You can usually go home the same day so long as you are eating, drinking, voiding, and pain is well controlled.  You will be sent home with all needed  "supplies.     - Post-Op medications you will be given in addition to the anesthesia include: Zofran (nausea), Oxycodone (pain), Z-amando (infection), and antipruritic (itching).     - You will leave the hospitals with an ace bandage wrapped around your chest for compression. You may keep the ace bandage on until you come back for your one week post op visit or you may adjust the wrap as needed if it is either too tight or too loose.     Post-Op Cares:     - No lifting over 5 lbs for 3 weeks after surgery    - No stretching arms out or above the head (\"modified T-anusha\")    - Walk around frequently to help prevent blood clots    - Do deep breathing exercises to prevent pneumonia    - No sleeping on stomach x 3 weeks after surgery, if it is difficult not to roll over onto your stomach you can sleep in a recliner or use additional pillows    - Do not use any ice packs or heat packs    - Preventing constipation will be your responsibility. You can use whatever method works best for you such as; aloe, prune juice, Sennokot or Miralax.  No showering in the first week after your surgery.     What to expect at your 1st post op visit:    When you come in for your 1st post op visit, we will assess the output of your drains and remove the pain catheter (On-Q) that was placed on your chest.     -Please remember to bring the documentations of your output with you to your appointment so we can review how much your drains have been putting out since your surgery.     -We will remove the bolsters that was placed on your nipples and teach you how to perform nipple graft dressings.     -You will be given supplies to take home and will need to continue wearing the ace wrap compression for another week after the drains are removed.     Nipple Care:   Change nipple dressings daily.  Take dressings off prior to showering and reapply after showering.  No direct shower spray on nipple grafts for 4 weeks.     Cut vaseline gauze to nipple size " and place over nipple.  Place folded white gauze over vaseline gauze and cover with plastic dressing.  Do dressing changes for 1 more week.  If you continue to have drainage, continue the dressings until drainage stops.       Drain Care:  You will be discharged with Dylan-Piña (BRADLEY) drainage tubes.  These tubes drain fluid from your incision, helping to prevent swelling and reducing the risk for infection.  The tube is held in place by a few stitches. Pin your BRADLEY drain to your clothing by using a safety pin through the plastic loop on the top of the bulb. If the drain is not attached to your clothing, it may pull out from under your skin. Drains are uncomfortable!    Emptying the drain bulb: The measuring cup provided by the hospital or a measuring cup that is used only for the BRADLEY drain.  Wash your hands with soap and water.  Hold the bulb securely.  Remove the drainage plug from the emptying port.  Carefully turn the bulb upside down over the measuring cup, and gently squeeze all of the drainage into the measuring cup.  Squeeze the middle of the bulb firmly so that the bulb is as flat as possible.                                                                                                                                                    While still squeezing the bulb, replace the drainage plug. This step is important to keep the drain suction  Measure how much fluid you removed from the bulb.  Write down the amount and color of the fluid you removed from the bulb. If  you have more than one drain, keep a separate record for each one.  Empty the fluid into the toilet and flush.  Rinse the measuring cup, and wash your hands with soap and water.  You should empty the drain at least two times each day, in the morning and at bedtime.  Drainage tubes are removed when the output is less than 20ml in a 24 hour period for 2 consecutive days.  Output will be somewhere between 30 to 50 mLs per day, but don't be alarmed  if it is more or less. Output may not be equal between sides. Amounts will probably decrease over time.  The color coming from the drains may vary from deep red, light pink, or clear yellow.    Stripping the tube:  To prevent clots from blocking the drain, you will need to  strip  it. Stripping means that you use your fingers to squeeze along the length of the drain to help maintain the flow of drainage.  Wash your hands.  Using one hand, firmly hold the tubing near the insertion site (as close to your skin as the ace wrap and gauze dressing will allow). This will prevent the drain from being pulled out while you are stripping it and from pulling on skin and sutures.  [place] upper/top fingers and milk with the bottom or lower fingers.  Using your index finger and thumb of the other hand, squeeze the tubing below the  first hand. You should squeeze it firmly enough so the tubing becomes flat.   Maintain pressure on the tube, as you are squeezing, slide your index finger and thumb down the tube about 4-6 inches toward the bulb. (use alcohol wipes or lotion to help).  Then, release the hand closest to where the tube is attached to the body (making sure to keep pressure with the other hand), and move upper/anchor hand down. Squeeze, Repeat in segments.  Do not release the pressure you are creating in the tubing until you reach the bulb.  The whole tube will be flat.   If at any time it feels like the tube is pulling away from the skin or starts to hurt STOP! Then start over again more gently.   Strip the drain each time you empty it.

## 2022-04-13 NOTE — LETTER
Date:April 27, 2022      Provider requested that no letter be sent. Do not send.       [facility]

## 2022-04-13 NOTE — NURSING NOTE
"Chief Complaint   Patient presents with     RECHECK     Payam, is being seen today for a pre -op DOS 4/19.       Vitals:    04/13/22 1503   BP: 139/88   BP Location: Left arm   Patient Position: Chair   Cuff Size: Adult Regular   Pulse: 86   SpO2: 97%   Weight: 78.5 kg (173 lb)   Height: 1.676 m (5' 6\")       Body mass index is 27.92 kg/m .      Micaela Madera LPN    "

## 2022-04-13 NOTE — LETTER
4/13/2022       RE: Hana R Naegele  [address]  [place] [address]     Dear Colleague,    Thank you for referring your patient, Hana R Naegele, to the [facility] [place] at [facility]. Please see a copy of my visit note below.    PRS    Patient with planned upcoming surgery next week.  Excited.  Only question is regarding whether there needs to be continued self breast surveillance.  My answer to this is that there needs to be continued self breast examinations.  Explained that when performing masculinizing chest contouring surgery, not all of the breast tissue was removed.  Nearly all of it is removed, but there may be some breast tissue left behind.  Because this is the case, my recommendation is for the patient to be diligent about self examination into the future.  Patient understands and is agreeable.    Discussed the recovery after surgery, including need for drains, postop follow-up, and slow expansion of activities.  All of this was understood by the patient.  All questions were answered.    Discussed the risks of surgery, including but not limited to: Infection, bleeding, pain, poor scarring, hematoma, seroma, asymmetries, need for revision surgery, nipple areolar skin graft loss, nipple areolar depigmentation, DVT, PE, death.  Despite these risks, patient consents to would like to proceed with bilateral double incision mastectomies with free nipple areolar skin grafting.    Photography was done at last visit.  Letter support is been reviewed.    A total of 10 minutes was devoted to review of chart, direct face-to-face patient counseling and documentation during this encounter    Leonid Fraga MD, PhD        Again, thank you for allowing me to participate in the care of your patient.      Sincerely,    Leonid Fraga MD

## 2022-04-13 NOTE — PROGRESS NOTES
PRS    Patient with planned upcoming surgery next week.  Excited.  Only question is regarding whether there needs to be continued self breast surveillance.  My answer to this is that there needs to be continued self breast examinations.  Explained that when performing masculinizing chest contouring surgery, not all of the breast tissue was removed.  Nearly all of it is removed, but there may be some breast tissue left behind.  Because this is the case, my recommendation is for the patient to be diligent about self examination into the future.  Patient understands and is agreeable.    Discussed the recovery after surgery, including need for drains, postop follow-up, and slow expansion of activities.  All of this was understood by the patient.  All questions were answered.    Discussed the risks of surgery, including but not limited to: Infection, bleeding, pain, poor scarring, hematoma, seroma, asymmetries, need for revision surgery, nipple areolar skin graft loss, nipple areolar depigmentation, DVT, PE, death.  Despite these risks, patient consents to would like to proceed with bilateral double incision mastectomies with free nipple areolar skin grafting.    Photography was done at last visit.  Letter support is been reviewed.    A total of 10 minutes was devoted to review of chart, direct face-to-face patient counseling and documentation during this encounter    Leonid Fraga MD, PhD

## 2022-04-14 NOTE — PROGRESS NOTES
[facility] Dermato-[facility]ergology Note  Office visit  Encounter Date: Apr 18, 2022  ____________________________________________    CC: No chief complaint on file.      HPI:  (04/14/22)  Mr. Hana R Naegele is a(n) 26 year old adult who presents today as a new patient for allergy consultation  - patient was less than 1 year old and got Penicillins = according to mother broke out in a rash on the upper body and no other informations. It seems not to be an anaphylaxis. However, sister had a severe reaction to Penicillins with breathing problems.   - not sure if reaction came at first or consequent doses.  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)     Suicidal ideation     Major depressive disorder, recurrent episode with anxious distress (H)     Gender dysphoria in adolescent and adult     Past Medical History:   Diagnosis Date     Depressive disorder        Allergies:  Allergies   Allergen Reactions     Penicillins Anaphylaxis       Medications:  Current Outpatient Medications   Medication     Ascorbic Acid (VITAMIN C) 100 MG CHEW     BLACK ELDERBERRY PO     cholecalciferol 25 MCG (1000 UT) TABS     estradiol cypionate (DEPO-ESTRADIOL) 5 MG/ML injection     Melatonin 10 MG TBDP     multivitamin w/minerals (THERA-VIT-M) tablet     testosterone (ANDROGEL 1.62 % PUMP) 20.25 MG/ACT gel     vitamin B-12 (CYANOCOBALAMIN) 100 MCG tablet     No current facility-administered medications for this visit.       Social History:  The patient works as a wholesale food  industry. Patient has the following hobbies or non-occupational exposure: draw comics in apartment    Family History:  Family History   Problem Relation Age of Onset     Anxiety Disorder Mother      Cancer Mother      Cancer Maternal Grandfather      Cancer Paternal Grandfather    sister has Penicilliln allergy, but no other allergies in family    Previous Labs, Allergy Tests, Dermatopathology, Imaging:  none    Referred By: No referring provider defined for this encounter.     Allergy Tests:    Past Allergy Test    Order for Future Allergy Testing:    [x] Outpatient  [] Inpatient: [facility]..../ Bed ....       Skin Atopy (atopic dermatitis) [] Yes   [x] No .........  Contact allergies:   [] Yes   [x] No ..........  Hand eczema:   [] Yes   [x] No           Leading hand:   [] R   [] L       [] Ambidextrous         Drug allergies:        [x] Yes   [] No  Which?.Penicillins?.    Urticaria/Angioedema  [] Yes   [x] No .........  Food Allergy:  [] Yes   [x] No  which?......  Pets :  [x] Yes   [] No  which?.turtle         []  Rhinitis   [] Conjunctivitis   [] Sinusitis   [] Polyposis   [] Otitis   [] Pharyngitis         [x]  none  Operations:   [] Tonsils   [] Septum   [] Sinus   [] Polyposis        [] Asthma bronchiale   [] Coughing      [x]  none  Symptoms (mostly Rhinoconjunctivitis and Asthma) aggravated by:  Season   [] I   [] II   [] III   [] IV   []V   []VI   []VII   []VIII   []IX   []X   []XI   []XI     [] perennial   Day time      [] morning   [] noon      [] evening        [] night    [] whole day........  []  none  Location/changes    [] inside        [] outside   [] mountains    [] sea     [] others.............   []  none  Triggers, specific     [] animals     [] plants     [] dust              [] others ...........................    []  none  Triggers, others       [] work          [] psyche    [] sport            [] others .............................  []  none  Irritant                [] phys efforts [] smoke     [] heat/cold     [] odors  []others............... []  none    Order for PATCH TESTS  Reason for tests (suspected allergy): not necessary  Known previous allergies: none  Standardized panels  [] Standard panel (40 tests)  [] Preservatives & Antimicrobials (31 tests)  [] Emulsifiers & Additives (25 tests)   [] Perfumes/Flavours & Plants (25 tests)  [] Hairdresser panel (12 tests)  [] Rubber Chemicals (22 tests)  [] Plastics (26 tests)  [] Colorants/Dyes/Food additives (20 tests)  [] Metals (implants/dental) (24 tests)  [] Local anaesthetics/NSAIDs (13 tests)  [] Antibiotics & Antimycotics (14 tests)   [] Corticosteroids (15 tests)   [] Photopatch test (62 tests)   [] others: ...      [] Patient's own products: ...    DO NOT test if chemical or biological identity is unknown!     always ask from patient the product information and safety sheets (MSDS)       Order for PRICK TESTS    Reason for tests (suspected allergy): not necessary  Known previous allergies: none    Standardized prick panels  [] Atopic panel (20 tests)  [] Pediatric Panel (12 tests)  [] Milk, Meat, Eggs, Grains (20 tests)   [] Dust, Epithelia, Feathers (10 tests)  [] Fish, Seafood, Shellfish (17 tests)  [] Nuts, Beans (14 tests)  [] Spice, Vegetable, Fruit (17 tests)  [] Pollen Panel = Tree, Grass, Weed (24 tests)  [] Others: ...      [] Patient's own products: ...    DO NOT test if chemical or biological identity is unknown!     always ask from patient the product information and safety sheets (MSDS)     Standardized intradermal tests  [] Penicillium notatum [] Aspergillus fumigatus [] House dust mites D.far & D. pteron  [] Cat    [] dog  [] Others: ...  [] Bee venom   [] Wasp venom  !!Specific protocol with dilutions!!       Order for Drug allergy tests (prick & Intradermal)    [x] Penicillin G  [x] Ampicillin [] Cefazolin   [x] Ceftriaxone   [] Ceftazidime  [] Bactrim    [] Others: ...  Order for ... as test date    DRUG ALLERGY TEST SERIES  04/18/2022     ANTIBIOTICS      Prick Tests         Substance/ Allergen Conc Result (20 min) Remarks    Histamine Hydrochloride (ALK) 0.1 mg/ml ++     NaCl 0.9% -     Ceftriaxone* [2] 100 mg/ml      Benzylpenicillin (Penicillin G) 1 Eugene IU/ml (600 mg) -     Ampicillin 100mg/ml -       Intradermal Tests   immed immed delay delay      Substance Conc 1st dil  2nd dil  days  days remarks    NaCl 0.9%         Ceftriaxone* [2] 1:5 -        Benzylpenicillin (Penicillin G) 1:100 -        Ampicillin 1:10 -       [1]  Cefalexin/Cefazolin-group        [2]    Cefotaxim-group     [3]     Cefuroxim-group    ________________________________    Assessment & Plan:    ==> Final Diagnosis:     # ruling out Penicillin allergy (as child reaction?)    In prick and intradermal tests no signs for immediate type reaction to Penicillin G, Ampicillin and Ceftriaxone (Cephalosporine)  >> based on skin tests and the hx (with Amoxicillin well tolerated) no signs for specific immediate type reaction to Penicillins  * stable chronic illness    These conclusions are made at the best of one's knowledge and belief based on the provided evidence such as patient's history and allergy test results and they can change over time or can be incomplete because of missing information's.    ==> Treatment Plan:  >> in skin prick and intradermal tests no signs for specific immediate type reaction to Penicillin G, Ampicillin and Ceftriaxone. I would propose to use tomorrow Cephalosporines. However, if Penicillins are necessary I would give them tomorrow as well  >> the only part we cannot evaluate is a possible delayed type reaction because timing too short. But patient will observe the next 2-3 days the arms to rule out delayed reaction and if any reaction take photos    Procedures Performed: Allergy tests, including drug allergy  tests    Staff:  Provider    Follow-up in Derm-Allergy clinic if necessary      I spent a total of 30 minutes with Hana R Naegele.  This time was spent counseling the patient and/or coordinating care, explaining the allergy tests, performing allergy tests and assessing the clinical relevance.

## 2022-04-15 ENCOUNTER — LAB (OUTPATIENT)
Dept: LAB | Facility: CLINIC | Age: 27
End: 2022-04-15
Payer: COMMERCIAL

## 2022-04-15 DIAGNOSIS — Z11.59 ENCOUNTER FOR SCREENING FOR OTHER VIRAL DISEASES: ICD-10-CM

## 2022-04-15 PROCEDURE — U0003 INFECTIOUS AGENT DETECTION BY NUCLEIC ACID (DNA OR RNA); SEVERE ACUTE RESPIRATORY SYNDROME CORONAVIRUS 2 (SARS-COV-2) (CORONAVIRUS DISEASE [COVID-19]), AMPLIFIED PROBE TECHNIQUE, MAKING USE OF HIGH THROUGHPUT TECHNOLOGIES AS DESCRIBED BY CMS-2020-01-R: HCPCS

## 2022-04-15 PROCEDURE — U0005 INFEC AGEN DETEC AMPLI PROBE: HCPCS

## 2022-04-16 LAB — SARS-COV-2 RNA RESP QL NAA+PROBE: NEGATIVE

## 2022-04-18 ENCOUNTER — OFFICE VISIT (OUTPATIENT)
Dept: ALLERGY | Facility: CLINIC | Age: 27
End: 2022-04-18
Payer: COMMERCIAL

## 2022-04-18 ENCOUNTER — PRE VISIT (OUTPATIENT)
Dept: ALLERGY | Facility: CLINIC | Age: 27
End: 2022-04-18
Payer: COMMERCIAL

## 2022-04-18 DIAGNOSIS — Z88.9 DRUG ALLERGY: Primary | ICD-10-CM

## 2022-04-18 PROCEDURE — 99203 OFFICE O/P NEW LOW 30 MIN: CPT | Mod: 25 | Performed by: DERMATOLOGY

## 2022-04-18 PROCEDURE — 95018 ALL TSTG PERQ&IQ DRUGS/BIOL: CPT | Performed by: DERMATOLOGY

## 2022-04-18 RX ORDER — PENICILLIN G POTASSIUM 5000000 [IU]/1
5000000 INJECTION, POWDER, FOR SOLUTION INTRAMUSCULAR; INTRAVENOUS ONCE
Status: COMPLETED | OUTPATIENT
Start: 2022-04-18 | End: 2022-04-18

## 2022-04-18 RX ORDER — AMPICILLIN 1 G/1
1 INJECTION, POWDER, FOR SOLUTION INTRAMUSCULAR; INTRAVENOUS ONCE
Status: COMPLETED | OUTPATIENT
Start: 2022-04-18 | End: 2022-04-18

## 2022-04-18 RX ORDER — CEFTRIAXONE SODIUM 1 G
250 VIAL (EA) INJECTION ONCE
Status: COMPLETED | OUTPATIENT
Start: 2022-04-18 | End: 2022-04-18

## 2022-04-18 RX ADMIN — AMPICILLIN 1 G: 1 INJECTION, POWDER, FOR SOLUTION INTRAMUSCULAR; INTRAVENOUS at 13:00

## 2022-04-18 RX ADMIN — PENICILLIN G POTASSIUM 5000000 UNITS: 5000000 INJECTION, POWDER, FOR SOLUTION INTRAMUSCULAR; INTRAVENOUS at 13:00

## 2022-04-18 RX ADMIN — Medication 250 MG: at 13:00

## 2022-04-18 ASSESSMENT — PAIN SCALES - GENERAL: PAINLEVEL: NO PAIN (0)

## 2022-04-18 NOTE — NURSING NOTE
Chief Complaint   Patient presents with     Allergy Testing Followup     Allergy Consult     Payam is here today for allergy testing to PCN with surgery tomorrow. He has a reaction as a very small child.     Marquez Xiao, Paramedic

## 2022-04-18 NOTE — PATIENT INSTRUCTIONS
DRUG ALLERGY TEST SERIES 04/18/2022     ANTIBIOTICS      Prick Tests         Substance/ Allergen Conc Result (20 min) Remarks    Histamine Hydrochloride (ALK) 0.1 mg/ml ++     NaCl 0.9% -     Ceftriaxone* [2] 100 mg/ml      Benzylpenicillin (Penicillin G) 1 Eugene IU/ml (600 mg) -     Ampicillin 100mg/ml -       Intradermal Tests   immed immed delay delay      Substance Conc 1st dil  2nd dil  days  days remarks    NaCl 0.9%         Ceftriaxone* [2] 1:5 -        Benzylpenicillin (Penicillin G) 1:100 -        Ampicillin 1:10 -       [1]  Cefalexin/Cefazolin-group        [2]    Cefotaxim-group     [3]     Cefuroxim-group    ________________________________    Assessment & Plan:    ==> Final Diagnosis:     # ruling out Penicillin allergy (as child reaction?)  In prick and intradermal tests no signs for immediate type reaction to Penicillin G, Ampicillin and Ceftriaxone (Cephalosporine)  >> based on skin tests and the hx (with Amoxicillin well tolerated) no signs for specific immediate type reaction to Penicillins  * stable chronic illness    These conclusions are made at the best of one's knowledge and belief based on the provided evidence such as patient's history and allergy test results and they can change over time or can be incomplete because of missing information's.    ==> Treatment Plan:  >> in skin prick and intradermal tests no signs for specific immediate type reaction to Penicillin G, Ampicillin and Ceftriaxone. I would propose to use tomorrow Cephalosporines. However, if Penicillins are necessary I would give them tomorrow as well  >> the only part we cannot evaluate is a possible delayed type reaction because timing too short. But patient will observe the next 2-3 days the arms to rule out delayed reaction and if any reaction take photos

## 2022-04-18 NOTE — LETTER
4/18/2022         RE: Hana R Naegele  [address]  [place] [address]        Dear Colleague,    Thank you for referring your patient, Hana R Naegele, to the [facility]. Please see a copy of my visit note below.    [facility] Dermato-allergology Note  Office visit  Encounter Date: Apr 18, 2022  ____________________________________________    CC: No chief complaint on file.      HPI:  (04/14/22)  Mr. Hana R Naegele is a(n) 26 year old adult who presents today as a new patient for allergy consultation  - patient was less than 1 year old and got Penicillins = according to mother broke out in a rash on the upper body and no other informations. It seems not to be an anaphylaxis. However, sister had a severe reaction to Penicillins with breathing problems.   - not sure if reaction came at first or consequent doses.  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)     Suicidal ideation     Major depressive disorder, recurrent episode with anxious distress (H)     Gender dysphoria in adolescent and adult     Past Medical History:   Diagnosis Date     Depressive disorder        Allergies:  Allergies   Allergen Reactions     Penicillins Anaphylaxis       Medications:  Current Outpatient Medications   Medication     Ascorbic Acid (VITAMIN C) 100 MG CHEW     BLACK ELDERBERRY PO     cholecalciferol 25 MCG (1000 UT) TABS     estradiol cypionate (DEPO-ESTRADIOL) 5 MG/ML injection     Melatonin 10 MG TBDP     multivitamin  w/minerals (THERA-VIT-M) tablet     testosterone (ANDROGEL 1.62 % PUMP) 20.25 MG/ACT gel     vitamin B-12 (CYANOCOBALAMIN) 100 MCG tablet     No current facility-administered medications for this visit.       Social History:  The patient works as a wholesale food industry. Patient has the following hobbies or non-occupational exposure: draw comics in apartment    Family History:  Family History   Problem Relation Age of Onset     Anxiety Disorder Mother      Cancer Mother      Cancer Maternal Grandfather      Cancer Paternal Grandfather    sister has Penicilliln allergy, but no other allergies in family    Previous Labs, Allergy Tests, Dermatopathology, Imaging:  none    Referred By: No referring provider defined for this encounter.     Allergy Tests:    Past Allergy Test    Order for Future Allergy Testing:    [x] Outpatient  [] Inpatient: [facility]..../ Bed ....       Skin Atopy (atopic dermatitis) [] Yes   [x] No .........  Contact allergies:   [] Yes   [x] No ..........  Hand eczema:   [] Yes   [x] No           Leading hand:   [] R   [] L       [] Ambidextrous         Drug allergies:        [x] Yes   [] No  Which?.Penicillins?.    Urticaria/Angioedema  [] Yes   [x] No .........  Food Allergy:  [] Yes   [x] No  which?......  Pets :  [x] Yes   [] No  which?.turtle         []  Rhinitis   [] Conjunctivitis   [] Sinusitis   [] Polyposis   [] Otitis   [] Pharyngitis         [x]  none  Operations:   [] Tonsils   [] Septum   [] Sinus   [] Polyposis        [] Asthma bronchiale   [] Coughing      [x]  none  Symptoms (mostly Rhinoconjunctivitis and Asthma) aggravated by:  Season   [] I   [] II   [] III   [] IV   []V   []VI   []VII   []VIII   []IX   []X   []XI   []XI     [] perennial   Day time      [] morning   [] noon      [] evening        [] night    [] whole day........  []  none  Location/changes    [] inside        [] outside   [] mountains    [] sea     [] others.............   []  none  Triggers, specific     [] animals      [] plants     [] dust              [] others ...........................    []  none  Triggers, others       [] work          [] psyche    [] sport            [] others .............................  []  none  Irritant                [] phys efforts [] smoke    [] heat/cold     [] odors  []others............... []  none    Order for PATCH TESTS  Reason for tests (suspected allergy): not necessary  Known previous allergies: none  Standardized panels  [] Standard panel (40 tests)  [] Preservatives & Antimicrobials (31 tests)  [] Emulsifiers & Additives (25 tests)   [] Perfumes/Flavours & Plants (25 tests)  [] Hairdresser panel (12 tests)  [] Rubber Chemicals (22 tests)  [] Plastics (26 tests)  [] Colorants/Dyes/Food additives (20 tests)  [] Metals (implants/dental) (24 tests)  [] Local anaesthetics/NSAIDs (13 tests)  [] Antibiotics & Antimycotics (14 tests)   [] Corticosteroids (15 tests)   [] Photopatch test (62 tests)   [] others: ...      [] Patient's own products: ...    DO NOT test if chemical or biological identity is unknown!     always ask from patient the product information and safety sheets (MSDS)       Order for PRICK TESTS    Reason for tests (suspected allergy): not necessary  Known previous allergies: none    Standardized prick panels  [] Atopic panel (20 tests)  [] Pediatric Panel (12 tests)  [] Milk, Meat, Eggs, Grains (20 tests)   [] Dust, Epithelia, Feathers (10 tests)  [] Fish, Seafood, Shellfish (17 tests)  [] Nuts, Beans (14 tests)  [] Spice, Vegetable, Fruit (17 tests)  [] Pollen Panel = Tree, Grass, Weed (24 tests)  [] Others: ...      [] Patient's own products: ...    DO NOT test if chemical or biological identity is unknown!     always ask from patient the product information and safety sheets (MSDS)     Standardized intradermal tests  [] Penicillium notatum [] Aspergillus fumigatus [] House dust mites D.far & D. pteron  [] Cat    [] dog  [] Others: ...  [] Bee venom   [] Wasp venom   !!Specific protocol with dilutions!!       Order for Drug allergy tests (prick & Intradermal)    [x] Penicillin G  [x] Ampicillin [] Cefazolin   [x] Ceftriaxone   [] Ceftazidime  [] Bactrim    [] Others: ...  Order for ... as test date    DRUG ALLERGY TEST SERIES 04/18/2022     ANTIBIOTICS      Prick Tests         Substance/ Allergen Conc Result (20 min) Remarks    Histamine Hydrochloride (ALK) 0.1 mg/ml ++     NaCl 0.9% -     Ceftriaxone* [2] 100 mg/ml      Benzylpenicillin (Penicillin G) 1 Eugene IU/ml (600 mg) -     Ampicillin 100mg/ml -       Intradermal Tests   immed immed delay delay      Substance Conc 1st dil  2nd dil  days  days remarks    NaCl 0.9%         Ceftriaxone* [2] 1:5 -        Benzylpenicillin (Penicillin G) 1:100 -        Ampicillin 1:10 -       [1]  Cefalexin/Cefazolin-group        [2]    Cefotaxim-group     [3]     Cefuroxim-group    ________________________________    Assessment & Plan:    ==> Final Diagnosis:     # ruling out Penicillin allergy (as child reaction?)    In prick and intradermal tests no signs for immediate type reaction to Penicillin G, Ampicillin and Ceftriaxone (Cephalosporine)  >> based on skin tests and the hx (with Amoxicillin well tolerated) no signs for specific immediate type reaction to Penicillins  * stable chronic illness    These conclusions are made at the best of one's knowledge and belief based on the provided evidence such as patient's history and allergy test results and they can change over time or can be incomplete because of missing information's.    ==> Treatment Plan:  >> in skin prick and intradermal tests no signs for specific immediate type reaction to Penicillin G, Ampicillin and Ceftriaxone. I would propose to use tomorrow Cephalosporines. However, if Penicillins are necessary I would give them tomorrow as well  >> the only part we cannot evaluate is a possible delayed type reaction because timing too short. But patient will observe the next 2-3 days the  arms to rule out delayed reaction and if any reaction take photos    Procedures Performed: Allergy tests, including drug allergy  tests    Staff:  Provider    Follow-up in Derm-Allergy clinic if necessary      I spent a total of 30 minutes with Hana R Naegele. This time was spent counseling the patient and/or coordinating care, explaining the allergy tests, performing allergy tests and assessing the clinical relevance.        Again, thank you for allowing me to participate in the care of your patient.        Sincerely,        Bud Vera MD

## 2022-04-19 ENCOUNTER — HOSPITAL ENCOUNTER (OUTPATIENT)
Facility: AMBULATORY SURGERY CENTER | Age: 27
Discharge: HOME | End: 2022-04-19
Attending: STUDENT IN AN ORGANIZED HEALTH CARE EDUCATION/TRAINING PROGRAM
Payer: COMMERCIAL

## 2022-04-19 ENCOUNTER — ANESTHESIA (OUTPATIENT)
Dept: SURGERY | Facility: AMBULATORY SURGERY CENTER | Age: 27
End: 2022-04-19
Payer: COMMERCIAL

## 2022-04-19 VITALS
RESPIRATION RATE: 16 BRPM | BODY MASS INDEX: 27.8 KG/M2 | OXYGEN SATURATION: 98 % | DIASTOLIC BLOOD PRESSURE: 81 MMHG | HEART RATE: 77 BPM | WEIGHT: 173 LBS | SYSTOLIC BLOOD PRESSURE: 128 MMHG | HEIGHT: 66 IN | TEMPERATURE: 97.9 F

## 2022-04-19 DIAGNOSIS — F64.0 GENDER DYSPHORIA IN ADOLESCENT AND ADULT: ICD-10-CM

## 2022-04-19 PROCEDURE — 19303 MAST SIMPLE COMPLETE: CPT | Mod: LT

## 2022-04-19 PROCEDURE — 19350 NIPPLE/AREOLA RECONSTRUCTION: CPT | Mod: 50 | Performed by: STUDENT IN AN ORGANIZED HEALTH CARE EDUCATION/TRAINING PROGRAM

## 2022-04-19 PROCEDURE — 19350 NIPPLE/AREOLA RECONSTRUCTION: CPT | Mod: LT

## 2022-04-19 PROCEDURE — 88305 TISSUE EXAM BY PATHOLOGIST: CPT | Mod: 26 | Performed by: PATHOLOGY

## 2022-04-19 PROCEDURE — 19303 MAST SIMPLE COMPLETE: CPT | Mod: 50 | Performed by: STUDENT IN AN ORGANIZED HEALTH CARE EDUCATION/TRAINING PROGRAM

## 2022-04-19 PROCEDURE — 88305 TISSUE EXAM BY PATHOLOGIST: CPT | Mod: TC | Performed by: STUDENT IN AN ORGANIZED HEALTH CARE EDUCATION/TRAINING PROGRAM

## 2022-04-19 RX ORDER — OXYCODONE HYDROCHLORIDE 5 MG/1
5 TABLET ORAL EVERY 4 HOURS PRN
Status: DISCONTINUED | OUTPATIENT
Start: 2022-04-19 | End: 2022-04-20 | Stop reason: HOSPADM

## 2022-04-19 RX ORDER — SODIUM CHLORIDE, SODIUM LACTATE, POTASSIUM CHLORIDE, CALCIUM CHLORIDE 600; 310; 30; 20 MG/100ML; MG/100ML; MG/100ML; MG/100ML
INJECTION, SOLUTION INTRAVENOUS CONTINUOUS
Status: DISCONTINUED | OUTPATIENT
Start: 2022-04-19 | End: 2022-04-20 | Stop reason: HOSPADM

## 2022-04-19 RX ORDER — CEFAZOLIN SODIUM 2 G/50ML
2 SOLUTION INTRAVENOUS
Status: COMPLETED | OUTPATIENT
Start: 2022-04-19 | End: 2022-04-19

## 2022-04-19 RX ORDER — PROPOFOL 10 MG/ML
INJECTION, EMULSION INTRAVENOUS PRN
Status: DISCONTINUED | OUTPATIENT
Start: 2022-04-19 | End: 2022-04-19

## 2022-04-19 RX ORDER — OXYCODONE HYDROCHLORIDE 5 MG/1
5 TABLET ORAL EVERY 6 HOURS PRN
Qty: 12 TABLET | Refills: 0 | Status: SHIPPED | OUTPATIENT
Start: 2022-04-19 | End: 2022-04-22

## 2022-04-19 RX ORDER — HYDROMORPHONE HYDROCHLORIDE 1 MG/ML
0.2 INJECTION, SOLUTION INTRAMUSCULAR; INTRAVENOUS; SUBCUTANEOUS EVERY 5 MIN PRN
Status: DISCONTINUED | OUTPATIENT
Start: 2022-04-19 | End: 2022-04-20 | Stop reason: HOSPADM

## 2022-04-19 RX ORDER — CEPHALEXIN 500 MG/1
500 CAPSULE ORAL 4 TIMES DAILY
Qty: 12 CAPSULE | Refills: 0 | Status: SHIPPED | OUTPATIENT
Start: 2022-04-19 | End: 2023-07-12

## 2022-04-19 RX ORDER — FENTANYL CITRATE 50 UG/ML
25 INJECTION, SOLUTION INTRAMUSCULAR; INTRAVENOUS EVERY 5 MIN PRN
Status: DISCONTINUED | OUTPATIENT
Start: 2022-04-19 | End: 2022-04-20 | Stop reason: HOSPADM

## 2022-04-19 RX ORDER — CEFAZOLIN SODIUM 2 G/50ML
2 SOLUTION INTRAVENOUS SEE ADMIN INSTRUCTIONS
Status: DISCONTINUED | OUTPATIENT
Start: 2022-04-19 | End: 2022-04-20 | Stop reason: HOSPADM

## 2022-04-19 RX ORDER — FENTANYL CITRATE 50 UG/ML
INJECTION, SOLUTION INTRAMUSCULAR; INTRAVENOUS PRN
Status: DISCONTINUED | OUTPATIENT
Start: 2022-04-19 | End: 2022-04-19

## 2022-04-19 RX ORDER — ACETAMINOPHEN 325 MG/1
975 TABLET ORAL ONCE
Status: COMPLETED | OUTPATIENT
Start: 2022-04-19 | End: 2022-04-19

## 2022-04-19 RX ORDER — ONDANSETRON 4 MG/1
4 TABLET, FILM COATED ORAL EVERY 6 HOURS PRN
Qty: 12 TABLET | Refills: 0 | Status: SHIPPED | OUTPATIENT
Start: 2022-04-19 | End: 2023-07-12

## 2022-04-19 RX ORDER — ONDANSETRON 4 MG/1
4 TABLET, ORALLY DISINTEGRATING ORAL EVERY 30 MIN PRN
Status: DISCONTINUED | OUTPATIENT
Start: 2022-04-19 | End: 2022-04-20 | Stop reason: HOSPADM

## 2022-04-19 RX ORDER — DEXAMETHASONE SODIUM PHOSPHATE 4 MG/ML
INJECTION, SOLUTION INTRA-ARTICULAR; INTRALESIONAL; INTRAMUSCULAR; INTRAVENOUS; SOFT TISSUE PRN
Status: DISCONTINUED | OUTPATIENT
Start: 2022-04-19 | End: 2022-04-19

## 2022-04-19 RX ORDER — BUPIVACAINE HYDROCHLORIDE 2.5 MG/ML
INJECTION, SOLUTION EPIDURAL; INFILTRATION; INTRACAUDAL PRN
Status: DISCONTINUED | OUTPATIENT
Start: 2022-04-19 | End: 2022-04-19 | Stop reason: HOSPADM

## 2022-04-19 RX ORDER — ONDANSETRON 2 MG/ML
4 INJECTION INTRAMUSCULAR; INTRAVENOUS EVERY 30 MIN PRN
Status: DISCONTINUED | OUTPATIENT
Start: 2022-04-19 | End: 2022-04-20 | Stop reason: HOSPADM

## 2022-04-19 RX ORDER — GLYCOPYRROLATE 0.2 MG/ML
INJECTION, SOLUTION INTRAMUSCULAR; INTRAVENOUS PRN
Status: DISCONTINUED | OUTPATIENT
Start: 2022-04-19 | End: 2022-04-19

## 2022-04-19 RX ORDER — MEPERIDINE HYDROCHLORIDE 25 MG/ML
12.5 INJECTION INTRAMUSCULAR; INTRAVENOUS; SUBCUTANEOUS
Status: DISCONTINUED | OUTPATIENT
Start: 2022-04-19 | End: 2022-04-20 | Stop reason: HOSPADM

## 2022-04-19 RX ORDER — FENTANYL CITRATE 50 UG/ML
25 INJECTION, SOLUTION INTRAMUSCULAR; INTRAVENOUS
Status: DISCONTINUED | OUTPATIENT
Start: 2022-04-19 | End: 2022-04-20 | Stop reason: HOSPADM

## 2022-04-19 RX ORDER — METHOCARBAMOL 500 MG/1
500 TABLET, FILM COATED ORAL 4 TIMES DAILY
Qty: 12 TABLET | Refills: 0 | Status: SHIPPED | OUTPATIENT
Start: 2022-04-19 | End: 2023-07-12

## 2022-04-19 RX ORDER — PROPOFOL 10 MG/ML
INJECTION, EMULSION INTRAVENOUS CONTINUOUS PRN
Status: DISCONTINUED | OUTPATIENT
Start: 2022-04-19 | End: 2022-04-19

## 2022-04-19 RX ORDER — LIDOCAINE HYDROCHLORIDE 20 MG/ML
INJECTION, SOLUTION INFILTRATION; PERINEURAL PRN
Status: DISCONTINUED | OUTPATIENT
Start: 2022-04-19 | End: 2022-04-19

## 2022-04-19 RX ORDER — ONDANSETRON 2 MG/ML
INJECTION INTRAMUSCULAR; INTRAVENOUS PRN
Status: DISCONTINUED | OUTPATIENT
Start: 2022-04-19 | End: 2022-04-19

## 2022-04-19 RX ADMIN — GLYCOPYRROLATE 0.2 MG: 0.2 INJECTION, SOLUTION INTRAMUSCULAR; INTRAVENOUS at 12:44

## 2022-04-19 RX ADMIN — OXYCODONE HYDROCHLORIDE 5 MG: 5 TABLET ORAL at 16:05

## 2022-04-19 RX ADMIN — PROPOFOL 200 MG: 10 INJECTION, EMULSION INTRAVENOUS at 12:53

## 2022-04-19 RX ADMIN — Medication 0.5 MG: at 14:28

## 2022-04-19 RX ADMIN — SODIUM CHLORIDE, SODIUM LACTATE, POTASSIUM CHLORIDE, CALCIUM CHLORIDE: 600; 310; 30; 20 INJECTION, SOLUTION INTRAVENOUS at 11:43

## 2022-04-19 RX ADMIN — FENTANYL CITRATE 100 MCG: 50 INJECTION, SOLUTION INTRAMUSCULAR; INTRAVENOUS at 12:53

## 2022-04-19 RX ADMIN — DEXAMETHASONE SODIUM PHOSPHATE 4 MG: 4 INJECTION, SOLUTION INTRA-ARTICULAR; INTRALESIONAL; INTRAMUSCULAR; INTRAVENOUS; SOFT TISSUE at 12:54

## 2022-04-19 RX ADMIN — LIDOCAINE HYDROCHLORIDE 40 MG: 20 INJECTION, SOLUTION INFILTRATION; PERINEURAL at 12:53

## 2022-04-19 RX ADMIN — FENTANYL CITRATE 25 MCG: 50 INJECTION, SOLUTION INTRAMUSCULAR; INTRAVENOUS at 16:03

## 2022-04-19 RX ADMIN — ONDANSETRON 4 MG: 2 INJECTION INTRAMUSCULAR; INTRAVENOUS at 13:04

## 2022-04-19 RX ADMIN — ACETAMINOPHEN 975 MG: 325 TABLET ORAL at 11:42

## 2022-04-19 RX ADMIN — CEFAZOLIN SODIUM 2 G: 2 SOLUTION INTRAVENOUS at 12:45

## 2022-04-19 RX ADMIN — PROPOFOL 200 MCG/KG/MIN: 10 INJECTION, EMULSION INTRAVENOUS at 12:54

## 2022-04-19 NOTE — OP NOTE
PLASTIC SURGERY OPERATIVE REPORT     Date of Surgery: 4/19/2022  Surgical Service: Plastic Surgery     Preoperative Diagnosis: Chest gender dysphoria     Postoperative Diagnosis: Same as preoperative diagnosis     Procedures Performed: Bilateral transgender double-incision mastectomies with free nipple-areolar grafting     Attending:  TEE Fraga MD, PhD  Assistant:  ABIMAEL Contreras MD     Complications: None apparent  Specimens: Left and right breast tissue for permanent  Implants: None  Estimated blood loss: 25 mL  Tourniquet time: Not applicable  Wound classification: Clean  Anesthesia: General     Indications for Procedure: 26 year-old transmale patient presenting with chest gender dysphoria presenting for bilateral mastectomies with free nipple-areolar grafting. Patient has been in current desired gender role for at least 1 year, has an excellent support system including a therapist, has a letter of support for chest surgery, and negative breast screening imaging study. Patient understands all of the risks of surgery and would like to proceed with gender affirmation top surgery.      Intraoperative findings: Minimal-to-no bleeding. Hemostasis aggressively obtained. Drains x 2. Tacked down incisions at inferior pectoralis major borders with 2-0 PDS to chest wall with symmetry between sides. 2.5 x 1.8 cm nipple-areolar skin grafts at same distance from incision, midline and sternal notch, bilaterally.      Description of Procedure: Patient was seen in the preoperative holding area.  Consent was verified.  The breasts and chest were marked.  All additional questions were answered.  The risks of the surgery were reiterated, including (but not limited to): infection, bleeding, hematoma, pain, poor scarring, asymmetries, loss of nipple-areola skin graft, loss of areolar skin pigment, prolonged wound healing issues, need for revision surgery.  Following discussion of all these risks, the patient again agreed to proceed with  surgery.  Patient was then transferred to the operating room and placed supine on the operating table.  All pressure points were padded.  Sequential compression devices were placed on bilateral lower extremities and verified to be operational.  IV antibiotic prophylaxis was given.  Preinduction timeout was performed.  General anesthesia was started. The chest, both breasts, shoulders, upper abdomen were prepped and draped in usual sterile fashion. The same procedure was done on both sides, starting with the right breast. First, a 2.5 x 1.5 cm nipple-areolar skin graft was harvested and placed in moistened Raytec. Next, the preoperatively marked lower incision was made using a #10 blade. Once down through subcutaneous tissue, monopolar cautery was used to get through subcutaneous layer onto pectoralis and upper abdominal deep fascia. Next, monopolar electrocautery was used to lift the breast tissue or mastectomy specimen off the pectoralis major fascia with care made to leave a thin layer of fat on the fascia and to not cauterize the muscle. This was done almost up to the clavicles bilaterally. Hemostasis was obtained throughout. At this point, the upper marking was incised once confirmed that the skin flap would be adequate given planned placement of incision. The mastectomy was completed with monopolar electrocautery. Hemostasis was again obtained. The upper chest skin flaps were compared in thickness. Additional chest flap thinning was done with cautery and facelift scissors. Next, the the inferior and lateral edge of the pectoralis major muscle was marked over the deep fascia. The upper chest/breast skin flap was tacked down onto the chest was with 2-0 PDS suture to keep the incision in the planned location, straighter medially and extending obliquely and cephalad parallel to the lateral pectoralis muscle border. Next, the native inframammary fold was obliterated with cautery and the upper abdominal flap was  "slightly undermined. Several three-point 2-0 PDS sutures were placed laterally to tack the corner of the lateral skin the chest wall to gently contour the lateral chest. A 15-French round drain was placed per side and secured with a 3-0 Nylon suture. The lower skin flap was then gently pulled up and a layered closure was done. The incision was closed from medial and lateral to prevent dog-ears using 2-0 PDS suture for the superficial fascia, 2-0 Vicryl for the deeper dermis, 3-0 Monocryl suture in the deep dermis and 3-0 Stratafix running intracuticular suture. Care was taken to make the incision placement as symmetric as possible. The patient was sat upright a few times and additional skin resection was done as needed. Once the skin was closed, the patient was sat upright and the nipple areolar position was determined on each side based on what appeared optimal aesthetically. Additionally, new nipple midline, sternal notch-new nipple and [id number] distances were also measured to make markings symmetric. Next, the skin was scored to reflect a 2.5 x 1.8 cm areolar dimensions. The epidermis was removed from the new areolar area. Next, the nipple-areolar skin grafts harvested at the beginning of the case was thinned and sewn onto the de-epithelialized areas using interrupted and running 5-0 Plain gut suture. Once done, bolster dressings made of sterile sponges, Xeroform and bacitracin were sewn down onto the center of the skin grafts with a through-and-through 2-0 Prolene suture. The bolster dressings were additional sewn in place using 3-0 Nylon interrupted suture along the periphery of the skin grafts. The edges of the bolster dressing were coated with antibiotic ointment. The lower incisions were dressed with skin adhesive and longitudinal steri-strips. The nipple-areolar bolster was covered with 4 x 4 gauze and a Tegaderm. Finally, fluff gauze and a 6\" ACE bandage was wrapped around the chest as a " compression garment. The patient was then extubated and transferred to the post-anesthesia care unit with no events.      Postoperative plan: Patient will be seen in the clinic at approximately 1 week for bolster removal and drain care versus removal.      Leonid Fraga MD, PhD

## 2022-04-19 NOTE — DISCHARGE INSTRUCTIONS
Plastic Surgery Discharge Instructions    Patient has been treated for top gender dysphoria with Dr. Fraga on 4/19/2022.     Care: Please leave the ACE bandage or compression garment in place at all times. In 48 hours, you can shower but remove the ACE gently, let the water run over the area, and gently pad dry. Once out of the shower, please apply the ACE bandage or compression garment as soon as possible. Conduct drain care by measuring the drain outputs daily. Please bring that record to the clinic in 1 week. Try not to lift off the clear plastic dressing.     Medications: You can take Ibuprofen 400-800 mg and Tylenol 650 mg for pain relief. Please take each every 6 hours, and for optimal pain relief - please stagger the medications so that you are taking one or the other every 3 hours. If you have been prescribed additional pain medications or muscle relaxants, please take as instructed and as needed. If you are taking additional pain medications, please do not exceed 4000 mg of Tylenol daily from all sources. Also, if you are taking narcotic medications, please do not operate heavy machinery or drive. If you have been prescribed antibiotics, please also take as instructed and for the first few days after surgery.     Diet: Start with clear liquids and slow down if nauseated. Advance the diet as tolerated.    Weight-bearing: You can use your arms. No heavy lifting. No strenuous activities. For 2 weeks, do not elevate your heart rate above 100 beats per minute and no lifting greater than 3-5 pounds. After your first clinic visit, we will discuss advancing your activity level to include cardio workout and more lifting.     ER Instructions: Please call the office and/or consider return to the ER if you experience worsening pain not relieved by medications, increased swelling, redness or high fevers >101F or if there are unexpected problems like shortness of breath.    Post-op follow-up: Clinic in 1 week with  "Dr. Fraga at the [facility]  Home Care Following Anesthesia  For 24 hours after surgery:  Get plenty of rest.  A responsible adult must stay with you for at least 24 hours after you leave the surgery center.  Do not drive or use heavy equipment.  If you have weakness or tingling, don't drive or use heavy equipment until this feeling goes away.   Do not drink alcohol.   Avoid strenuous or risky activities.  Ask for help when climbing stairs.  You may feel lightheaded.  IF so, sit for a few minutes before standing.  Have someone help you get up.   If you have nausea (feel sick to your stomach): Drink only clear liquids such as apple juice, ginger ale, broth or 7-Up.  Rest may also help.  Be sure to drink enough fluids.  Move to a regular diet as you feel able.   You may have a slight fever.  Call the doctor if your fever is over 100 F (37.7 C) (taken under the tongue) or lasts longer than 24 hours.  You may have a dry mouth, a sore throat, muscle aches or trouble sleeping. These should go away after 24 hours.  Do not make important or legal decisions.   It is recommended to avoid smoking.        Today you received a Marcaine or bupivacaine block to numb the nerves near your surgery site.  This is a block using local anesthetic or \"numbing\" medication injected around the nerves to anesthetize or \"numb\" the area supplied by those nerves.  This block is injected into the muscle layer near your surgical site.  The medication may numb the location where you had surgery for 6-18 hours, but may last up to 24 hours.  If your surgical site is an arm or leg you should be careful with your affected limb, since it is possible to injure your limb without being aware of it due to the numbing.  Until full feeling returns, you should guard against bumping or hitting your limb, and avoid extreme hot or cold temperatures on the skin.  As the block wears off, the feeling " will return as a tingling or prickly sensation near your surgical site.  You will experience more discomfort from your incision as the feeling returns.  You may want to take a pain pill (a narcotic or Tylenol if this was prescribed by your surgeon) when you start to experience mild pain before the pain beccomes more severe.  If your pain medications do not control your pain you should notifiy your surgeon.    Tips for taking pain medications  To get the best pain relief possible, remember these points:  Take pain medications as directed, before pain becomes severe.  Pain medication can upset your stomach: taking it with food may help.  Constipation is a common side effect of pain medication. Drink plenty of  fluids.  Eat foods high in fiber. Take a stool softener if recommended by your doctor or pharmacist.  Do not drink alcohol, drive or operate machinery while taking pain medications.  Ask about other ways to control pain, such as with heat, ice or relaxation.    Tylenol/Acetaminophen Consumption  To help encourage the safe use of acetaminophen, the makers of TYLENOL  have lowered the maximum daily dose for single-ingredient Extra Strength TYLENOL  (acetaminophen) products sold in the [place] from 8 pills per day (4,000 mg) to 6 pills per day (3,000 mg). The dosing interval has also changed from 2 pills every 4-6 hours to 2 pills every 6 hours.  If you feel your pain relief is insufficient, you may take Tylenol/Acetaminophen in addition to your narcotic pain medication.   Be careful not to exceed 3,000 mg of Tylenol/Acetaminophen in a 24 hour period from all sources.  If you are taking extra strength Tylenol/acetaminophen (500 mg), the maximum dose is 6 tablets in 24 hours.  If you are taking regular strength acetaminophen (325 mg), the maximum dose is 9 tablets in 24 hours.  You received Tylenol at 11:40 am. You can take your next dose at 5:40 pm.    Call a doctor for any of the following:  Signs of infection  (fever, growing tenderness at the surgery site, a large amount of drainage or bleeding, severe pain, foul-smelling drainage, redness, swelling).  It has been over 8 to 10 hours since surgery and you are still not able to urinate (pass water).  Headache for over 24 hours.  Numbness, tingling or weakness the day after surgery (if you had spinal anesthesia).  Signs of Covid-19 infection (temperature over 100 degrees, shortness of breath, cough, loss of taste/smell, generalized body aches, persistent headache, chills, sore throat, nausea/vomiting/diarrhea)  Your doctor is:       Dr. Leonid Fraga, Plastic Surgery: [phone]               Or dial [phone] and ask for the resident on call for:  Plastics  For emergency care, call the:  [facility]:  [phone] (TTY for hearing impaired: [phone])    Caring for Your Dylan-Piña Drain    You have been discharged with a Dylan-Piña drainage tube. This tube drains fluid from your incision, helping prevent swelling and reducing the risk for infection. The tube is held in place by a few stitches. The drain will be removed when your doctor determines you no longer need it and when the amount of drainage decreases. The color and amount of fluid varies. Right after surgery, the fluid may be bright red and may become clearer over time.   Dressing:  Keep the skin around the tube dry.  If you have a dressing, change it every day.   Wash your hands.  Remove the old bandage. Do not use scissors-you may accidentally cut the tube.  Check for any redness, swelling, drainage, or broken stitches. (Call your doctor with any of these findings).  Wash your hands again.  Wet a cotton swab (Q-tip) and clean around the incision and the tube site. Use normal saline solution (salt and water) or soap and water. Start at the tube site and move outward in a circular motion.   Pat dry.  Put a new bandage on the incision and tube site. Make the bandage large enough  to cover the whole incision area.   Tape the bandage in place.  Throw out old materials and wash your hands.   Home Care:  Tape the tube to the skin below the bandage. Make sure to keep some slack in the tube to keep it from pulling out.   DO NOT sleep on the same side as the tube.  Secure the tube and bulb inside your clothing with a safety pin. This helps keep the tube from being pulled out.   Keep the bulb compressed at all times, except when you empty it.  Empty your drain at least twice a day. Empty it more often if the drain is full.   Lift the opening of the drain.  Drain the fluid into a measuring cup.  Record the amount of fluid each time you empty. Share the information with your doctor at your follow-up visit.   Squeeze the bulb with your hands until you hear air coming out of the bulb.  Close the opening.   Tape plastic wrap over the bandage and tube site when you shower.    Stripping  the tube helps keep blood clots from blocking the tube.                         ONLY DO THIS IF YOUR DOCTOR INSTRUCTED YOU TO DO SO!  Hold the tubing where it leaves the skin with one hand. This keeps it from pulling on the skin.  Pinch the tubing with the thumb and first finger of your other hand.   Slowly and firmly pull your thumb and first finger down the tube (squeezing the tube between your fingers). Keep squeezing the tube as you run your fingers towards the bulb. If the pulling hurts or feels like it is coming out of the skin, STOP. Begin again more gently.  Let go of the tubing with both hands. If the tube is still blocked, repeat these steps three or four times. Make sure that the bulb is compressed so it creates suction.  When to call your doctor:  New or increased pain around the tube  Redness, warmth, or swelling around the incision or tube  Drainage that is foul smelling  Vomiting  Fever over 101 F degrees  Fluid leaking around the tube  Incision seems not to be healing  Stitches become loose  The tube falls  out  Drainage that changes from light pick to dark red  A sudden increase or decrease in the amount of drainage (over 30 ml).  Your drainage record:  Date Time Bulb 1: Amount of drainage (ml or cc) Bulb 2: Amount of drainage (ml or cc) Notes

## 2022-04-19 NOTE — BRIEF OP NOTE
[facility]    Brief Operative Note    Pre-operative diagnosis: Gender dysphoria in adolescent and adult [F64.0]  Post-operative diagnosis Same as pre-operative diagnosis    Procedure: Procedure(s):  BILATERAL DOUBLE-INCISION MASTECTOMIES WITH FREE NIPPLE-AREOLAR SKIN GRAFTING  Surgeon: Surgeon(s) and Role:     * Leonid Fraga MD - Primary     * Marquez Contreras MD - Resident - Assisting  Anesthesia: General   Estimated Blood Loss: 25 cc    Drains: Dylan-Piña x2  Specimens:   ID Type Source Tests Collected by Time Destination   1 : Left Breast Tissue Breast, Left SURGICAL PATHOLOGY EXAM Leonid Fraga MD 4/19/2022  2:19 PM    2 : Right Breast Tissue Breast, Right SURGICAL PATHOLOGY EXAM Leonid Fraga MD 4/19/2022  2:20 PM      Findings:   None.  Complications: None.  Implants: * No implants in log *

## 2022-04-19 NOTE — ANESTHESIA CARE TRANSFER NOTE
Patient: Hana R Naegele    Procedure: Procedure(s):  BILATERAL DOUBLE-INCISION MASTECTOMIES WITH FREE NIPPLE-AREOLAR SKIN GRAFTING       Diagnosis: Gender dysphoria in adolescent and adult [F64.0]  Diagnosis Additional Information: No value filed.    Anesthesia Type:   General     Note:    Oropharynx: oropharynx clear of all foreign objects and spontaneously breathing  Level of Consciousness: awake  Oxygen Supplementation: face mask  Level of Supplemental Oxygen (L/min / FiO2): 6  Independent Airway: airway patency satisfactory and stable  Dentition: dentition unchanged  Vital Signs Stable: post-procedure vital signs reviewed and stable  Report to RN Given: handoff report given  Patient transferred to: PACU    Handoff Report: Identifed the Patient, Identified the Reponsible Provider, Reviewed the pertinent medical history, Discussed the surgical course, Reviewed Intra-OP anesthesia mangement and issues during anesthesia, Set expectations for post-procedure period and Allowed opportunity for questions and acknowledgement of understanding      Vitals:  Vitals Value Taken Time   BP     Temp     Pulse     Resp     SpO2         Electronically Signed By: FELY Colmenares CRNA  April 19, 2022  3:58 PM

## 2022-04-19 NOTE — ANESTHESIA POSTPROCEDURE EVALUATION
Patient: Hana R Naegele    Procedure: Procedure(s):  BILATERAL DOUBLE-INCISION MASTECTOMIES WITH FREE NIPPLE-AREOLAR SKIN GRAFTING       Anesthesia Type:  General    Note:  Disposition: Outpatient   Postop Pain Control: Uneventful            Sign Out: Well controlled pain   PONV: No   Neuro/Psych: Uneventful            Sign Out: Acceptable/Baseline neuro status   Airway/Respiratory: Uneventful            Sign Out: Acceptable/Baseline resp. status   CV/Hemodynamics: Uneventful            Sign Out: Acceptable CV status; No obvious hypovolemia; No obvious fluid overload   Other NRE: NONE   DID A NON-ROUTINE EVENT OCCUR? No           Last vitals:  Vitals Value Taken Time   /79 04/19/22 1615   Temp 36.6  C (97.8  F) 04/19/22 1611   Pulse 88 04/19/22 1616   Resp 12 04/19/22 1616   SpO2 99 % 04/19/22 1616   Vitals shown include unvalidated device data.    Electronically Signed By: Solo Armstrong MD  April 19, 2022  4:25 PM

## 2022-04-19 NOTE — ANESTHESIA PREPROCEDURE EVALUATION
Anesthesia Pre-Procedure Evaluation    Patient: Hana R Naegele   MRN: [id number] : 1995        Procedure : Procedure(s):  BILATERAL DOUBLE-INCISION MASTECTOMIES WITH FREE NIPPLE-AREOLAR SKIN GRAFTING          Past Medical History:   Diagnosis Date     Depressive disorder       Past Surgical History:   Procedure Laterality Date     ENT SURGERY      ear tubes     wisdom teeth removed in         No Known Allergies   Social History     Tobacco Use     Smoking status: Never Smoker     Smokeless tobacco: Never Used   Substance Use Topics     Alcohol use: Not on file      Wt Readings from Last 1 Encounters:   22 78.5 kg (173 lb)        Anesthesia Evaluation   Pt has had prior anesthetic. Type: General.    No history of anesthetic complications       ROS/MED HX  ENT/Pulmonary:  - neg pulmonary ROS     Neurologic:  - neg neurologic ROS     Cardiovascular:  - neg cardiovascular ROS  (-) murmur   METS/Exercise Tolerance: >4 METS    Hematologic:  - neg hematologic  ROS     Musculoskeletal:  - neg musculoskeletal ROS     GI/Hepatic:  - neg GI/hepatic ROS     Renal/Genitourinary:  - neg Renal ROS     Endo:  - neg endo ROS     Psychiatric/Substance Use:  - neg psychiatric ROS     Infectious Disease:  - neg infectious disease ROS     Malignancy:  - neg malignancy ROS     Other: Comment: Declines pregnancy testing           Physical Exam    Airway        Mallampati: I   TM distance: > 3 FB   Neck ROM: full   Mouth opening: > 3 cm    Respiratory Devices and Support         Dental  no notable dental history         Cardiovascular          Rhythm and rate: regular and normal (-) no murmur    Pulmonary   pulmonary exam normal        breath sounds clear to auscultation           OUTSIDE LABS:  CBC:   Lab Results   Component Value Date    WBC 6.9 2017    HGB 14.2 2017    HCT 39.0 2017     2017     BMP:   Lab Results   Component Value Date     2017    POTASSIUM 3.6 2017     CHLORIDE 109 01/30/2017    CO2 25 01/30/2017    BUN 9 01/30/2017    CR 0.69 01/30/2017    GLC 79 01/30/2017     COAGS: No results found for: PTT, INR, FIBR  POC:   Lab Results   Component Value Date    HCG Negative 01/29/2017     HEPATIC: No results found for: ALBUMIN, PROTTOTAL, ALT, AST, GGT, ALKPHOS, BILITOTAL, BILIDIRECT, EMIGDIO  OTHER:   Lab Results   Component Value Date    GRANT 8.8 01/30/2017    TSH 1.77 01/30/2017       Anesthesia Plan    ASA Status:  1   NPO Status:  NPO Appropriate    Anesthesia Type: General.     - Airway: LMA   Induction: Intravenous, Propofol.   Maintenance: Balanced.        Consents    Anesthesia Plan(s) and associated risks, benefits, and realistic alternatives discussed. Questions answered and patient/representative(s) expressed understanding.    - Discussed:     - Discussed with:  Patient      - Specific Concerns: sore throat, post op pain/nausea, dental damage, rare major complications.     - Extended Intubation/Ventilatory Support Discussed: No.      - Patient is DNR/DNI Status: No    Use of blood products discussed: No .     Postoperative Care    Pain management: IV analgesics, Oral pain medications, Multi-modal analgesia.   PONV prophylaxis: Ondansetron (or other 5HT-3), Dexamethasone or Solumedrol, Background Propofol Infusion     Comments:    Other Comments: Mastectomy for gender dysphoria. Past history of penicillin allergy but recent allergy testing without reaction. No plan for preoperative nerve block per surgeon preference.        H&P reviewed: Unable to attach H&P to encounter due to EHR limitations. H&P Update: appropriate H&P reviewed, patient examined. No interval changes since H&P (within 30 days).         Solo Armstrong MD

## 2022-04-25 LAB
PATH REPORT.COMMENTS IMP SPEC: NORMAL
PATH REPORT.COMMENTS IMP SPEC: NORMAL
PATH REPORT.FINAL DX SPEC: NORMAL
PATH REPORT.GROSS SPEC: NORMAL
PATH REPORT.MICROSCOPIC SPEC OTHER STN: NORMAL
PATH REPORT.RELEVANT HX SPEC: NORMAL
PHOTO IMAGE: NORMAL

## 2022-04-26 ENCOUNTER — OFFICE VISIT (OUTPATIENT)
Dept: PLASTIC SURGERY | Facility: CLINIC | Age: 27
End: 2022-04-26
Payer: COMMERCIAL

## 2022-04-26 VITALS
TEMPERATURE: 98.7 F | OXYGEN SATURATION: 96 % | SYSTOLIC BLOOD PRESSURE: 137 MMHG | DIASTOLIC BLOOD PRESSURE: 82 MMHG | HEART RATE: 92 BPM

## 2022-04-26 DIAGNOSIS — F64.0 GENDER DYSPHORIA IN ADOLESCENT AND ADULT: Primary | ICD-10-CM

## 2022-04-26 PROCEDURE — 99024 POSTOP FOLLOW-UP VISIT: CPT | Performed by: PHYSICIAN ASSISTANT

## 2022-04-26 ASSESSMENT — PAIN SCALES - GENERAL: PAINLEVEL: MILD PAIN (2)

## 2022-04-26 NOTE — LETTER
4/26/2022       RE: Hana R Naegele  [address]  [place] [address]     Dear Colleague,    Thank you for referring your patient, Hana R Naegele, to the [facility] [place] [facility]. Please see a copy of my visit note below.    Plastic Surgery Outpatient Visit    ID: Vic R Naegele is a 26 year old male s/p bilateral mastectomy with free nipple grafts for gender affirmation 4/19 with Dr. Fraga.     S: Overall doing well, complains of pain at drain sites. Drains <10cc yesterday. Had issues filling oxycodone after surgery due to the pharmacy being output stock. Taking scheduled tylenol/ibuprofen.     O:  /82   Pulse 92   Temp 98.7  F (37.1  C) (Oral)   SpO2 96%    General: NAD  Chest: bilateral chest incisions c/d/i with tape intact. Nipple grafts well adhered bilaterally. Moderate residual ecchymsis. No significant swelling.    Path:  Final Diagnosis   A. LEFT breast, simple mastectomy with free nipple graft:  -Benign breast tissue   -Negative for atypia or malignancy     B. RIGHT breast, simple mastectomy with free nipple graft:  -Benign breast tissue   -Rare calcifications in benign acini  -Negative for atypia or malignancy      A/P:  -Drains removed today  -Continue gentle chest compression with ACE bandage or compression garment 24/7 for the next 3 weeks.  After this, patient can continue chest compression at nighttime only for postoperative weeks 5-8.  -Reiterated activity restrictions.  No cardio exercises for another week.  After this, patient can initiate light cardio exercises.  -Continue lifting restriction of approximately 5 pounds for the next week, then can increase to 10 pounds during postoperative weeks 3-4, and 15 to 20 pounds for postoperative weeks 5-6.  -RTC 2 weeks with TITUS RinaldiC  Plastic and Reconstructive Surgery    15 minutes spent on the date of the  encounter doing chart review, history and physical, dressing changes, documentation and further activity as noted above.      Again, thank you for allowing me to participate in the care of your patient.      Sincerely,    Mariana Lo PA-C

## 2022-04-26 NOTE — NURSING NOTE
Chief Complaint   Patient presents with     Surgical Followup     Post-op -- 1 week -- DOS 4/19       Vitals:    04/26/22 0818   BP: 137/82   Pulse: 92   Temp: 98.7  F (37.1  C)   TempSrc: Oral   SpO2: 96%       There is no height or weight on file to calculate BMI.          BALDOMERO BENITEZ EMT

## 2022-04-26 NOTE — PATIENT INSTRUCTIONS
-Continue gentle chest compression with ACE bandage or compression garment 24/7 for the next 3 weeks.  After this, patient can continue chest compression at nighttime only for postoperative weeks 5-8.  -Reiterated activity restrictions. No cardio exercises for another week.  After this, patient can initiate light cardio exercises.  -Continue lifting restriction of approximately 5 pounds for the next week, then can increase to 10 pounds during postoperative weeks 3-4, and 15 to 20 pounds for postoperative weeks 5-6.  -ok to start silicone based scar care once tape is off

## 2022-04-26 NOTE — PROGRESS NOTES
Plastic Surgery Outpatient Visit    ID: Vic R Naegele is a 26 year old male s/p bilateral mastectomy with free nipple grafts for gender affirmation 4/19 with Dr. Fraga.     S: Overall doing well, complains of pain at drain sites. Drains <10cc yesterday. Had issues filling oxycodone after surgery due to the pharmacy being output stock. Taking scheduled tylenol/ibuprofen.     O:  /82   Pulse 92   Temp 98.7  F (37.1  C) (Oral)   SpO2 96%    General: NAD  Chest: bilateral chest incisions c/d/i with tape intact. Nipple grafts well adhered bilaterally. Moderate residual ecchymsis. No significant swelling.    Path:  Final Diagnosis   A. LEFT breast, simple mastectomy with free nipple graft:  -Benign breast tissue   -Negative for atypia or malignancy     B. RIGHT breast, simple mastectomy with free nipple graft:  -Benign breast tissue   -Rare calcifications in benign acini  -Negative for atypia or malignancy      A/P:  -Drains removed today  -Continue gentle chest compression with ACE bandage or compression garment 24/7 for the next 3 weeks.  After this, patient can continue chest compression at nighttime only for postoperative weeks 5-8.  -Reiterated activity restrictions.  No cardio exercises for another week.  After this, patient can initiate light cardio exercises.  -Continue lifting restriction of approximately 5 pounds for the next week, then can increase to 10 pounds during postoperative weeks 3-4, and 15 to 20 pounds for postoperative weeks 5-6.  -RTC 2 weeks with TITUS RinaldiC  Plastic and Reconstructive Surgery    15 minutes spent on the date of the encounter doing chart review, history and physical, dressing changes, documentation and further activity as noted above.

## 2022-04-26 NOTE — LETTER
Date:May 18, 2022      Provider requested that no letter be sent. Do not send.       [facility]

## 2022-05-04 ENCOUNTER — OFFICE VISIT (OUTPATIENT)
Dept: PLASTIC SURGERY | Facility: CLINIC | Age: 27
End: 2022-05-04
Payer: COMMERCIAL

## 2022-05-04 VITALS
OXYGEN SATURATION: 97 % | HEIGHT: 66 IN | BODY MASS INDEX: 27.8 KG/M2 | SYSTOLIC BLOOD PRESSURE: 111 MMHG | TEMPERATURE: 98.9 F | HEART RATE: 89 BPM | WEIGHT: 173 LBS | DIASTOLIC BLOOD PRESSURE: 74 MMHG

## 2022-05-04 DIAGNOSIS — F64.0 GENDER DYSPHORIA IN ADOLESCENT AND ADULT: Primary | ICD-10-CM

## 2022-05-04 PROCEDURE — 99024 POSTOP FOLLOW-UP VISIT: CPT | Performed by: STUDENT IN AN ORGANIZED HEALTH CARE EDUCATION/TRAINING PROGRAM

## 2022-05-04 ASSESSMENT — PAIN SCALES - GENERAL: PAINLEVEL: NO PAIN (0)

## 2022-05-04 NOTE — LETTER
Date:May 23, 2022      Provider requested that no letter be sent. Do not send.       [facility]

## 2022-05-04 NOTE — LETTER
5/4/2022       RE: Hana R Naegele  [address]  [place] [address]     Dear Colleague,    Thank you for referring your patient, Hana R Naegele, to the [facility] SURGERY CLIN[facility] [place] at [facility]. Please see a copy of my visit note below.    PRS    No issues.  Very happy with result.      On exam, bilateral chest incisions are intact with good symmetry.  Nipple areolar grafts are healing.    Path: Benign breast tissue negative for malignancy    A/P: 26-year-old transmale 2 weeks status post bilateral double incision mastectomies with free nipple areolar skin grafting    -Aquaphor ointment with band-aid to the nipple-areola  -Continue gentle chest compression with ACE bandage or compression garment 24/7 for the next 2 weeks.  After this, patient can continue chest compression at nighttime only for postoperative weeks 5-8.  -Reiterated activity restrictions.  Patient can initiate light cardio exercises.  -Continue lifting restriction of approximately 10 pounds for the next 2 weeks, then can increase to 15 to 20 pounds for postoperative weeks 5-6.  -Reiterated importance of continued chest self-examination in lieu of likelihood that some breast tissue is left behind. Patient understands and is agreeable.   -Photography done  -Return to clinic in 4 weeks for reevaluation    Leonid Fraga MD, PhD      Again, thank you for allowing me to participate in the care of your patient.      Sincerely,    Leonid Fraga MD

## 2022-05-04 NOTE — PROGRESS NOTES
PRS    No issues.  Very happy with result.      On exam, bilateral chest incisions are intact with good symmetry.  Nipple areolar grafts are healing.    Path: Benign breast tissue negative for malignancy    A/P: 26-year-old transmale 2 weeks status post bilateral double incision mastectomies with free nipple areolar skin grafting    -Aquaphor ointment with band-aid to the nipple-areola  -Continue gentle chest compression with ACE bandage or compression garment 24/7 for the next 2 weeks.  After this, patient can continue chest compression at nighttime only for postoperative weeks 5-8.  -Reiterated activity restrictions.  Patient can initiate light cardio exercises.  -Continue lifting restriction of approximately 10 pounds for the next 2 weeks, then can increase to 15 to 20 pounds for postoperative weeks 5-6.  -Reiterated importance of continued chest self-examination in lieu of likelihood that some breast tissue is left behind. Patient understands and is agreeable.   -Photography done  -Return to clinic in 4 weeks for reevaluation    Leonid Fraga MD, PhD

## 2022-05-04 NOTE — NURSING NOTE
"Chief Complaint   Patient presents with     RECHECK     Payam, is being seen today for a 2 week post -op DOS 4/19, BRADLEY drain site check,right side white and redness,  dull ache when moving arms, 4-5/10 nerve pain, as reported by patient.       Vitals:    05/04/22 1444   BP: 111/74   BP Location: Left arm   Patient Position: Chair   Cuff Size: Adult Large   Pulse: 89   Temp: 98.9  F (37.2  C)   TempSrc: Oral   SpO2: 97%   Weight: 78.5 kg (173 lb)   Height: 1.676 m (5' 6\")       Body mass index is 27.92 kg/m .      Micaela Madera LPN    "

## 2022-06-01 ENCOUNTER — OFFICE VISIT (OUTPATIENT)
Dept: PLASTIC SURGERY | Facility: CLINIC | Age: 27
End: 2022-06-01
Payer: COMMERCIAL

## 2022-06-01 VITALS — OXYGEN SATURATION: 98 % | SYSTOLIC BLOOD PRESSURE: 129 MMHG | DIASTOLIC BLOOD PRESSURE: 81 MMHG | HEART RATE: 81 BPM

## 2022-06-01 DIAGNOSIS — F64.0 GENDER DYSPHORIA IN ADOLESCENT AND ADULT: Primary | ICD-10-CM

## 2022-06-01 PROCEDURE — 99024 POSTOP FOLLOW-UP VISIT: CPT | Performed by: STUDENT IN AN ORGANIZED HEALTH CARE EDUCATION/TRAINING PROGRAM

## 2022-06-01 ASSESSMENT — PAIN SCALES - GENERAL: PAINLEVEL: NO PAIN (1)

## 2022-06-01 NOTE — LETTER
6/1/2022       RE: Hana R Naegele  [address]  [place] [address]     Dear Colleague,    Thank you for referring your patient, Hana R Naegele, to the [facility] PLASTIC AND RECONSTRUCTIVE SURGERY CLINIC [place] at [facility]. Please see a copy of my visit note below.    PRS    No issues.  Still has some tugging sensation along the lateral aspects of bilateral chest incisions.  Has been doing scar treatment.    On exam, bilateral chest incisions are well-healing with reasonable symmetry and nipple areolar grafts well-healed.    Path: Benign breast tissue, negative for malignancy    A/P: 27-year-old 6 weeks status post bilateral gender affirming double incision mastectomies with free nipple areolar skin grafting    -Cleared for all activities  -Encouraged patient to continue scar treatment  -Encouraged patient to use sunscreen if they choose to become UV exposed  -Reiterated importance of self breast/chest examination since it is possible some breast tissue is left behind.  Patient understands and is agreeable.  -Photography today  -Return to clinic in 6-8 weeks for additional photos    Leonid Fraga MD, PhD      Again, thank you for allowing me to participate in the care of your patient.      Sincerely,    Leonid Fraga MD

## 2022-06-01 NOTE — LETTER
[facility] PLASTIC AND RECONSTRUCTIVE SURGERY CLINIC [address]  [address] [facility] [address]  [phone]          June 1, 2022    RE:  Hana R Naegele                                                                                                                                                       [address]  [place] [address]            To whom it may concern:    Hana R Naegele is under my professional care for gender dysphoria.     He can return to work at full capacity, but allow him to ease into work activities over the next 3-4 weeks.           Leonid Fraga MD, PhD  Staff Plastic Surgeon

## 2022-06-01 NOTE — LETTER
Date:Zuleika 10, 2022      Provider requested that no letter be sent. Do not send.       [facility]

## 2022-06-01 NOTE — NURSING NOTE
Chief Complaint   Patient presents with     Surgical Followup     6 week post-op -- DOS 4/19       Vitals:    06/01/22 1213   BP: 129/81   Pulse: 81   SpO2: 98%       There is no height or weight on file to calculate BMI.          BALDOMERO BENITEZ EMT

## 2022-06-01 NOTE — PROGRESS NOTES
PRS    No issues.  Still has some tugging sensation along the lateral aspects of bilateral chest incisions.  Has been doing scar treatment.    On exam, bilateral chest incisions are well-healing with reasonable symmetry and nipple areolar grafts well-healed.    Path: Benign breast tissue, negative for malignancy    A/P: 27-year-old 6 weeks status post bilateral gender affirming double incision mastectomies with free nipple areolar skin grafting    -Cleared for all activities  -Encouraged patient to continue scar treatment  -Encouraged patient to use sunscreen if they choose to become UV exposed  -Reiterated importance of self breast/chest examination since it is possible some breast tissue is left behind.  Patient understands and is agreeable.  -Photography today  -Return to clinic in 6-8 weeks for additional photos    Leonid Fraga MD, PhD

## 2022-08-10 ENCOUNTER — OFFICE VISIT (OUTPATIENT)
Dept: PLASTIC SURGERY | Facility: CLINIC | Age: 27
End: 2022-08-10
Payer: COMMERCIAL

## 2022-08-10 VITALS
SYSTOLIC BLOOD PRESSURE: 126 MMHG | TEMPERATURE: 98.9 F | OXYGEN SATURATION: 98 % | DIASTOLIC BLOOD PRESSURE: 79 MMHG | HEART RATE: 88 BPM

## 2022-08-10 DIAGNOSIS — L91.0 SCARRING, HYPERTROPHIC: ICD-10-CM

## 2022-08-10 DIAGNOSIS — F64.0 GENDER DYSPHORIA IN ADOLESCENT AND ADULT: Primary | ICD-10-CM

## 2022-08-10 PROCEDURE — 11900 INJECT SKIN LESIONS </W 7: CPT | Performed by: STUDENT IN AN ORGANIZED HEALTH CARE EDUCATION/TRAINING PROGRAM

## 2022-08-10 RX ORDER — TRIAMCINOLONE ACETONIDE 40 MG/ML
40 INJECTION, SUSPENSION INTRA-ARTICULAR; INTRAMUSCULAR ONCE
Status: ACTIVE | OUTPATIENT
Start: 2022-08-10

## 2022-08-10 NOTE — PROGRESS NOTES
Drug Administration Record    Drug Name: Kenalog  Dose: 1mL of triamcinolone 40mg/mL, 40 mg dose  Route administered: IM  NDC #: Kenalog-40 ([address]-2)  Amount of waste(mL): 0    LOT #: [id number]  SITE: See Provider Note  : [facility]   EXPIRATION DATE: 01/2024    Drug Administration Record    Drug Name: Xylocaine  Dose: 10mg/mL   Route administered: IM  NDC #: Xylocaine ([address]-80)  Amount of waste(mL): 4ml  Reason for waste: Multi dose vial    LOT #: [id number]  SITE: See provider note  : [employer] [facility]  EXPIRATION DATE: 12/25    CURTIS Caldwell

## 2022-08-10 NOTE — LETTER
Date:August 10, 2022      Provider requested that no letter be sent. Do not send.       [facility]

## 2022-08-10 NOTE — NURSING NOTE
Chief Complaint   Patient presents with     Surgical Followup     2 month post-op check up        Vitals:    08/10/22 1200   BP: 126/79   Pulse: 88   Temp: 98.9  F (37.2  C)   TempSrc: Oral   SpO2: 98%       There is no height or weight on file to calculate BMI.          BALDOMERO BENITEZ EMT

## 2022-08-10 NOTE — LETTER
8/10/2022       RE: Hana R Naegele  [address]  [place] [address]     Dear Colleague,    Thank you for referring your patient, Hana R Naegele, to the [facility] PLASTIC AND RECONSTRUCTIVE SURGERY CLINIC [place] at [facility]. Please see a copy of my visit note below.    PRS    Patient returns for greater than 3-month follow-up.  No issues.  Doing scar treatment.  In the sun and wearing sunscreen.  Has noticed early development of hypertrophic scarring.    On exam, bilateral chest incisions with some element of hypertrophic scarring but relative symmetry.     A/P: 27-year-old transmale >3 months status post bilateral double incision mastectomies with nipple areolar skin grafting, presenting with hypertrophic scarring    -Discussed the options for management, including continued scar treatment with silicone-based sheeting or ointment versus Bio-oil, use of sunscreen when UV exposed, gentle massage, as well as steroid injection.  Since we can improve the hypertrophic scarring with a steroid injection, and patient is willing to undergo steroid injection today, my recommendation is to inject triamcinolone intralesionally.  We did discuss that the scars will remodel over period of 9-10 more months.  At this time, the scars will flatten in the pink quality will hopefully diminish.  If there is persistent red discoloration to the scar at a later time, we can consider 532 or 585 nm laser.  -Offered bilateral chest scar intralesional steroid injections. Patient would like this. Discussed the risks of steroid injections, including but not limited to: infection, bleeding, pain, injury to tendons or nerves, fat atrophy, skin depigmentation. Despite these risks, patient consents to steroid injection. Cleansed the skin with Chlorhexidine and 1:1 mixture of 40 mg Kenalog and 1% lidocaine was injected into each treatment area/joint. Patient tolerated the  procedure with no issues and experienced immediate relief.   -Cleared for all activities  -Return to clinic in 8 weeks for reevaluation and possible additional steroid injection  -A total of 20 minutes was devoted to review of chart, direct face-to-face patient counseling and documentation during this encounter.    Leonid Fraga MD, PhD          Drug Administration Record    Drug Name: Kenalog  Dose: 1mL of triamcinolone 40mg/mL, 40 mg dose  Route administered: IM  NDC #: Kenalog-40 ([address]-5)  Amount of waste(mL): 0    LOT #: [id number]  SITE: See Provider Note  : [facility]   EXPIRATION DATE: 01/2024    Drug Administration Record    Drug Name: Xylocaine  Dose: 10mg/mL   Route administered: IM  NDC #: Xylocaine (65042-189-30)  Amount of waste(mL): 4ml  Reason for waste: Multi dose vial    LOT #: [id number]  SITE: See provider note  : [employer] [facility]  EXPIRATION DATE: 12/25    Manav Kim, EMT      Again, thank you for allowing me to participate in the care of your patient.      Sincerely,    Leonid Fraga MD

## 2022-08-10 NOTE — PROGRESS NOTES
PRS    Patient returns for greater than 3-month follow-up.  No issues.  Doing scar treatment.  In the sun and wearing sunscreen.  Has noticed early development of hypertrophic scarring.    On exam, bilateral chest incisions with some element of hypertrophic scarring but relative symmetry.     A/P: 27-year-old transmale >3 months status post bilateral double incision mastectomies with nipple areolar skin grafting, presenting with hypertrophic scarring    -Discussed the options for management, including continued scar treatment with silicone-based sheeting or ointment versus Bio-oil, use of sunscreen when UV exposed, gentle massage, as well as steroid injection.  Since we can improve the hypertrophic scarring with a steroid injection, and patient is willing to undergo steroid injection today, my recommendation is to inject triamcinolone intralesionally.  We did discuss that the scars will remodel over period of 9-10 more months.  At this time, the scars will flatten in the pink quality will hopefully diminish.  If there is persistent red discoloration to the scar at a later time, we can consider 532 or 585 nm laser.  -Offered bilateral chest scar intralesional steroid injections. Patient would like this. Discussed the risks of steroid injections, including but not limited to: infection, bleeding, pain, injury to tendons or nerves, fat atrophy, skin depigmentation. Despite these risks, patient consents to steroid injection. Cleansed the skin with Chlorhexidine and 1:1 mixture of 40 mg Kenalog and 1% lidocaine was injected into each treatment area/joint. Patient tolerated the procedure with no issues and experienced immediate relief.   -Cleared for all activities  -Return to clinic in 8 weeks for reevaluation and possible additional steroid injection  -A total of 20 minutes was devoted to review of chart, direct face-to-face patient counseling and documentation during this encounter.    Leonid Frgaa MD, PhD

## 2022-09-11 ENCOUNTER — HEALTH MAINTENANCE LETTER (OUTPATIENT)
Age: 27
End: 2022-09-11

## 2022-10-05 ENCOUNTER — OFFICE VISIT (OUTPATIENT)
Dept: PLASTIC SURGERY | Facility: CLINIC | Age: 27
End: 2022-10-05
Payer: COMMERCIAL

## 2022-10-05 VITALS
OXYGEN SATURATION: 98 % | HEART RATE: 90 BPM | DIASTOLIC BLOOD PRESSURE: 83 MMHG | WEIGHT: 164 LBS | HEIGHT: 66 IN | SYSTOLIC BLOOD PRESSURE: 127 MMHG | BODY MASS INDEX: 26.36 KG/M2 | TEMPERATURE: 98.3 F

## 2022-10-05 DIAGNOSIS — L91.0 SCARRING, HYPERTROPHIC: Primary | ICD-10-CM

## 2022-10-05 PROCEDURE — 99212 OFFICE O/P EST SF 10 MIN: CPT | Performed by: STUDENT IN AN ORGANIZED HEALTH CARE EDUCATION/TRAINING PROGRAM

## 2022-10-05 ASSESSMENT — PAIN SCALES - GENERAL: PAINLEVEL: NO PAIN (0)

## 2022-10-05 NOTE — LETTER
10/5/2022       RE: Hana R Naegele  [address]  [place] [address]     Dear Colleague,    Thank you for referring your patient, Hana R Naegele, to the [facility] PLASTIC AND RECONSTRUCTIVE SURGERY CLINIC [place] at [facility]. Please see a copy of my visit note below.    PRS    No issues.  Scar responded to intralesional steroid injections.  Patient is happy with scar remodeling.    On exam, bilateral chest scars are appropriately remodeling and with relatively good symmetry.  Several areas of steroid injection with telangiectasia and some fat atrophy.  Bilateral nipple areolar scars well-healed.    A/P: 27-year-old transmale approximately 5 months status post bilateral double incision mastectomies with free nipple areolar skin graft, doing well    -Patient responded well to intralesional steroid injections.  At this point, given some of the telangiectasias and fat atrophy, my inclination is to withhold additional steroid injections until allow the body to continue to remodel the scar on its own.  Patient is agreeable to this.  Explained that scars continue to remodel for an additional 6-7 months.  My expectation is that the scars will continue to flatten and become more imperceptible over time.  -Reiterated the importance of use of sunscreen when UV or sun exposed.  -Return to clinic in 7 months for reevaluation and photography  -A total of 10 minutes was devoted to review of chart, direct face-to-face patient counseling and documentation during this encounter, exclusive of any procedure performed.    Leonid Fraga MD, PhD              Again, thank you for allowing me to participate in the care of your patient.      Sincerely,    Leonid Fraga MD

## 2022-10-05 NOTE — LETTER
Date:October 5, 2022      Provider requested that no letter be sent. Do not send.       [facility]

## 2022-10-05 NOTE — PROGRESS NOTES
PRS    No issues.  Scar responded to intralesional steroid injections.  Patient is happy with scar remodeling.    On exam, bilateral chest scars are appropriately remodeling and with relatively good symmetry.  Several areas of steroid injection with telangiectasia and some fat atrophy.  Bilateral nipple areolar scars well-healed.    A/P: 27-year-old transmale approximately 5 months status post bilateral double incision mastectomies with free nipple areolar skin graft, doing well    -Patient responded well to intralesional steroid injections.  At this point, given some of the telangiectasias and fat atrophy, my inclination is to withhold additional steroid injections until allow the body to continue to remodel the scar on its own.  Patient is agreeable to this.  Explained that scars continue to remodel for an additional 6-7 months.  My expectation is that the scars will continue to flatten and become more imperceptible over time.  -Reiterated the importance of use of sunscreen when UV or sun exposed.  -Return to clinic in 7 months for reevaluation and photography  -A total of 10 minutes was devoted to review of chart, direct face-to-face patient counseling and documentation during this encounter, exclusive of any procedure performed.    Leonid Fraga MD, PhD

## 2022-10-05 NOTE — NURSING NOTE
"Chief Complaint   Patient presents with     RECHECK     Payam, is being seen today for a 2 month post-op, possible ILK injection.       Vitals:    10/05/22 1307   BP: 127/83   BP Location: Left arm   Patient Position: Chair   Cuff Size: Adult Regular   Pulse: 90   Temp: 98.3  F (36.8  C)   TempSrc: Oral   SpO2: 98%   Weight: 74.4 kg (164 lb)   Height: 1.676 m (5' 6\")       Body mass index is 26.47 kg/m .      Micaela Madera LPN    "

## 2023-01-23 ENCOUNTER — HEALTH MAINTENANCE LETTER (OUTPATIENT)
Age: 28
End: 2023-01-23

## 2023-03-07 ENCOUNTER — TELEPHONE (OUTPATIENT)
Dept: PLASTIC SURGERY | Facility: CLINIC | Age: 28
End: 2023-03-07
Payer: COMMERCIAL

## 2023-03-07 NOTE — TELEPHONE ENCOUNTER
CONCHITA and sent mychart that 5/3 appt needs rescheduling, Dr. Fraga no longer avail at 1:00pm that date. There are held slots later on the same day pt can use, otherwise ok to r/s to a different date.

## 2023-05-03 ENCOUNTER — OFFICE VISIT (OUTPATIENT)
Dept: PLASTIC SURGERY | Facility: CLINIC | Age: 28
End: 2023-05-03
Payer: COMMERCIAL

## 2023-05-03 ENCOUNTER — PATIENT OUTREACH (OUTPATIENT)
Dept: PLASTIC SURGERY | Facility: CLINIC | Age: 28
End: 2023-05-03

## 2023-05-03 VITALS
SYSTOLIC BLOOD PRESSURE: 125 MMHG | BODY MASS INDEX: 27.8 KG/M2 | HEIGHT: 66 IN | WEIGHT: 173 LBS | HEART RATE: 63 BPM | OXYGEN SATURATION: 99 % | DIASTOLIC BLOOD PRESSURE: 78 MMHG

## 2023-05-03 DIAGNOSIS — L91.0 SCARRING, HYPERTROPHIC: Primary | ICD-10-CM

## 2023-05-03 PROCEDURE — 99212 OFFICE O/P EST SF 10 MIN: CPT | Performed by: STUDENT IN AN ORGANIZED HEALTH CARE EDUCATION/TRAINING PROGRAM

## 2023-05-03 ASSESSMENT — PAIN SCALES - GENERAL: PAINLEVEL: NO PAIN (0)

## 2023-05-03 NOTE — PROGRESS NOTES
PRS    Returns for evaluation of scars following steroid injection. Patient is overall happy with the scarring and the result.    On exam, there is some fat atrophy along the right lateral scar with some widening in certain areas, but the color has faded.  There is no hypertrophic scarring.    A/P: 27-year-old trans male 1 year status post bilateral double incision mastectomies with free nipple areolar skin grafting, doing well    -Since the scars are no longer raised or hypertrophic, my recommendation is to continue to observe, perform scar treatment with gentle massage and consider silicone tape, avoid sun and use sunscreen when UV exposed.  The scar can continue to fade in color over the next 6 months.  In either case, patient would like to have a scar revision, we can revisit this in the future.  Patient is agreeable to plan.  -Photography today  -Return to clinic in 12-months    Leonid Fraga MD, PhD

## 2023-05-03 NOTE — NURSING NOTE
"Chief Complaint   Patient presents with     RECHECK     Payam, is being seen today for a post-op DOS 4/19/22, incision healing possible pitting, as reported by patient.       Vitals:    05/03/23 1508   BP: 125/78   BP Location: Left arm   Patient Position: Chair   Cuff Size: Adult Regular   Pulse: 63   SpO2: 99%   Weight: 78.5 kg (173 lb)   Height: 1.676 m (5' 6\")       Body mass index is 27.92 kg/m .      Micaela Madera LPN    "

## 2023-05-03 NOTE — PATIENT INSTRUCTIONS
Called pt to remind him of appt with Dr Fraga today at 3:00 pm. Pt did not answer but I was able to leave a VM with my direct call back number to discuss.  Scott PAEZ RN

## 2023-05-03 NOTE — LETTER
5/3/2023       RE: Hana R Naegele  [address]  [place] [address]       Dear Colleague,    Thank you for referring your patient, Hana R Naegele, to the [facility] PLASTIC AND RECONSTRUCTIVE SURGERY CLINIC [place] at [facility]. Please see a copy of my visit note below.    PRS    Returns for evaluation of scars following steroid injection. Patient is overall happy with the scarring and the result.    On exam, there is some fat atrophy along the right lateral scar with some widening in certain areas, but the color has faded.  There is no hypertrophic scarring.    A/P: 27-year-old trans male 1 year status post bilateral double incision mastectomies with free nipple areolar skin grafting, doing well    -Since the scars are no longer raised or hypertrophic, my recommendation is to continue to observe, perform scar treatment with gentle massage and consider silicone tape, avoid sun and use sunscreen when UV exposed.  The scar can continue to fade in color over the next 6 months.  In either case, patient would like to have a scar revision, we can revisit this in the future.  Patient is agreeable to plan.  -Photography today  -Return to clinic in 12-months      Again, thank you for allowing me to participate in the care of your patient.      Sincerely,    Leonid Fraga MD

## 2023-07-11 ASSESSMENT — ENCOUNTER SYMPTOMS
ABDOMINAL PAIN: 0
FEVER: 0
SORE THROAT: 0
CONSTIPATION: 0
ARTHRALGIAS: 1
HEADACHES: 0
NERVOUS/ANXIOUS: 1
COUGH: 0
BREAST MASS: 0
DYSURIA: 0
MYALGIAS: 0
HEMATURIA: 0
JOINT SWELLING: 0
EYE PAIN: 0
SHORTNESS OF BREATH: 1
DIZZINESS: 0
HEARTBURN: 0
NAUSEA: 0
HEMATOCHEZIA: 0
FREQUENCY: 0
DIARRHEA: 0
PARESTHESIAS: 0
WEAKNESS: 1
CHILLS: 0
PALPITATIONS: 1

## 2023-07-11 ASSESSMENT — PATIENT HEALTH QUESTIONNAIRE - PHQ9
SUM OF ALL RESPONSES TO PHQ QUESTIONS 1-9: 14
SUM OF ALL RESPONSES TO PHQ QUESTIONS 1-9: 14
10. IF YOU CHECKED OFF ANY PROBLEMS, HOW DIFFICULT HAVE THESE PROBLEMS MADE IT FOR YOU TO DO YOUR WORK, TAKE CARE OF THINGS AT HOME, OR GET ALONG WITH OTHER PEOPLE: SOMEWHAT DIFFICULT

## 2023-07-12 ENCOUNTER — OFFICE VISIT (OUTPATIENT)
Dept: FAMILY MEDICINE | Facility: CLINIC | Age: 28
End: 2023-07-12
Payer: COMMERCIAL

## 2023-07-12 VITALS
RESPIRATION RATE: 6 BRPM | WEIGHT: 168 LBS | HEART RATE: 84 BPM | DIASTOLIC BLOOD PRESSURE: 72 MMHG | TEMPERATURE: 98.1 F | HEIGHT: 65 IN | SYSTOLIC BLOOD PRESSURE: 117 MMHG | BODY MASS INDEX: 27.99 KG/M2 | OXYGEN SATURATION: 99 %

## 2023-07-12 DIAGNOSIS — F41.1 GAD (GENERALIZED ANXIETY DISORDER): Primary | ICD-10-CM

## 2023-07-12 DIAGNOSIS — R40.0 HAS DAYTIME DROWSINESS: ICD-10-CM

## 2023-07-12 DIAGNOSIS — L70.9 ACNE, UNSPECIFIED ACNE TYPE: ICD-10-CM

## 2023-07-12 PROCEDURE — 99204 OFFICE O/P NEW MOD 45 MIN: CPT

## 2023-07-12 RX ORDER — ESCITALOPRAM OXALATE 5 MG/1
5 TABLET ORAL DAILY
Qty: 90 TABLET | Refills: 0 | Status: SHIPPED | OUTPATIENT
Start: 2023-07-12 | End: 2023-09-20 | Stop reason: DRUGHIGH

## 2023-07-12 RX ORDER — DOXYCYCLINE HYCLATE 50 MG/1
50 CAPSULE ORAL DAILY
Qty: 90 CAPSULE | Refills: 3 | Status: SHIPPED | OUTPATIENT
Start: 2023-07-12 | End: 2023-09-20 | Stop reason: DRUGHIGH

## 2023-07-12 ASSESSMENT — ENCOUNTER SYMPTOMS
ABDOMINAL PAIN: 0
NAUSEA: 0
DIZZINESS: 0
FREQUENCY: 0
COUGH: 0
EYE PAIN: 0
HEMATURIA: 0
ARTHRALGIAS: 1
SORE THROAT: 0
NERVOUS/ANXIOUS: 1
PALPITATIONS: 1
CONSTIPATION: 0
DIARRHEA: 0
CHILLS: 0
DYSURIA: 0
JOINT SWELLING: 0
HEADACHES: 0
FEVER: 0
MYALGIAS: 0
WEAKNESS: 1
SHORTNESS OF BREATH: 1

## 2023-07-12 ASSESSMENT — ANXIETY QUESTIONNAIRES
2. NOT BEING ABLE TO STOP OR CONTROL WORRYING: MORE THAN HALF THE DAYS
6. BECOMING EASILY ANNOYED OR IRRITABLE: NEARLY EVERY DAY
GAD7 TOTAL SCORE: 13
5. BEING SO RESTLESS THAT IT IS HARD TO SIT STILL: MORE THAN HALF THE DAYS
7. FEELING AFRAID AS IF SOMETHING AWFUL MIGHT HAPPEN: SEVERAL DAYS
IF YOU CHECKED OFF ANY PROBLEMS ON THIS QUESTIONNAIRE, HOW DIFFICULT HAVE THESE PROBLEMS MADE IT FOR YOU TO DO YOUR WORK, TAKE CARE OF THINGS AT HOME, OR GET ALONG WITH OTHER PEOPLE: VERY DIFFICULT
3. WORRYING TOO MUCH ABOUT DIFFERENT THINGS: SEVERAL DAYS
GAD7 TOTAL SCORE: 13
1. FEELING NERVOUS, ANXIOUS, OR ON EDGE: SEVERAL DAYS

## 2023-07-12 ASSESSMENT — PATIENT HEALTH QUESTIONNAIRE - PHQ9: 5. POOR APPETITE OR OVEREATING: NEARLY EVERY DAY

## 2023-07-12 NOTE — PROGRESS NOTES
SUBJECTIVE:   CC: Payam is an 28 year old who presents for preventive health visit.       7/12/2023     3:03 PM   Additional Questions   Roomed by winsome mack   Accompanied by alone         7/12/2023     3:03 PM   Patient Reported Additional Medications   Patient reports taking the following new medications none     Healthy Habits:     Getting at least 3 servings of Calcium per day:  NO    Bi-annual eye exam:  NO    Dental care twice a year:  NO    Sleep apnea or symptoms of sleep apnea:  Daytime drowsiness    Diet:  Regular (no restrictions)    Frequency of exercise:  2-3 days/week    Duration of exercise:  45-60 minutes    Taking medications regularly:  Yes    Medication side effects:  Other    Additional concerns today:  Yes      Today's PHQ-9 Score:       7/11/2023     9:00 AM   PHQ-9 SCORE   PHQ-9 Total Score MyChart 14 (Moderate depression)   PHQ-9 Total Score 14      Payam presents for an annual physical with additional concerns.    Sleep  He would like a referral to sleep with concerns regarding sleep apnea. Family history of sleep apnea with his father who was diagnosed in his late 20's/early 30's.  He has been waking up feeling unrefreshed, even when sleeping for 10 hours, has daytime drowsiness. He has been told by his partner that he has significant snoring. In addition he has been on a stable Testosterone dose but levels have been inconsistent.     Mood  Has concerns regarding anxiety. Is currently seeing a discernment  with his partner, but does not have his own therapist. He has tried fluoxetine which was ineffective. Feels like his anxiety is affecting his relationship.     Acne  Has issues with cystic acne on his chest, shoulders, and back. Has tried topical benzoyl peroxide, salicylic acid, tretinoin. Can be painful.       Have you ever done Advance Care Planning? (For example, a Health Directive, POLST, or a discussion with a medical provider or your loved ones about your wishes): No, advance care  planning information given to patient to review.  Patient plans to discuss their wishes with loved ones or provider.      Social History     Tobacco Use     Smoking status: Never     Smokeless tobacco: Never   Substance Use Topics     Alcohol use: Not on file             2023     9:03 AM   Alcohol Use   Prescreen: >3 drinks/day or >7 drinks/week? No     Reviewed orders with patient.  Reviewed health maintenance and updated orders accordingly - Yes  Labs reviewed in EPIC  BP Readings from Last 3 Encounters:   23 117/72   23 125/78   10/05/22 127/83    Wt Readings from Last 3 Encounters:   23 76.2 kg (168 lb)   23 78.5 kg (173 lb)   10/05/22 74.4 kg (164 lb)              Breast Cancer Screenin/11/2023     9:03 AM   Breast CA Risk Assessment (FHS-7)   Do you have a family history of breast, colon, or ovarian cancer? No / Unknown       Patient under 40 years of age: Routine Mammogram Screening not recommended.   Pertinent mammograms are reviewed under the imaging tab.    History of abnormal Pap smear: NO - age 21-29 PAP every 3 years recommended     Reviewed and updated as needed this visit by clinical staff   Tobacco  Allergies  Meds              Reviewed and updated as needed this visit by Provider                 Past Medical History:   Diagnosis Date     Depressive disorder       Past Surgical History:   Procedure Laterality Date     ENT SURGERY      ear tubes     TRANSGENDER MASTECTOMY Bilateral 2022    Procedure: BILATERAL DOUBLE-INCISION MASTECTOMIES WITH FREE NIPPLE-AREOLAR SKIN GRAFTING;  Surgeon: Leonid Fraga MD;  Location: [facility] OR     wisdom teeth removed in          Review of Systems   Constitutional: Negative for chills and fever.   HENT: Positive for hearing loss. Negative for congestion, ear pain and sore throat.    Eyes: Positive for visual disturbance. Negative for pain.   Respiratory: Positive for shortness of breath. Negative for cough.   "  Cardiovascular: Positive for palpitations. Negative for chest pain.   Gastrointestinal: Negative for abdominal pain, constipation, diarrhea and nausea.   Genitourinary: Negative for dysuria, frequency, genital sores, hematuria and urgency.   Musculoskeletal: Positive for arthralgias. Negative for joint swelling and myalgias.   Skin: Negative for rash.   Neurological: Positive for weakness. Negative for dizziness and headaches.   Psychiatric/Behavioral: The patient is nervous/anxious.           OBJECTIVE:   /72 (BP Location: Left arm, Patient Position: Sitting, Cuff Size: Adult Regular)   Pulse 84   Temp 98.1  F (36.7  C) (Oral)   Resp (!) 6   Ht 1.651 m (5' 5\")   Wt 76.2 kg (168 lb)   LMP  (LMP Unknown)   SpO2 99%   BMI 27.96 kg/m    Physical Exam  GENERAL: healthy, alert and no distress  NECK: no adenopathy, no asymmetry, masses, or scars and thyroid normal to palpation  RESP: lungs clear to auscultation - no rales, rhonchi or wheezes  CV: regular rate and rhythm, normal S1 S2, no S3 or S4, no murmur, click or rub, no peripheral edema and peripheral pulses strong  MS: no gross musculoskeletal defects noted, no edema  SKIN: no suspicious lesions or rashes and acne shoulders, upper back and chest  PSYCH: mentation appears normal, affect normal/bright      ASSESSMENT/PLAN:   Dany was seen today for physical and recheck medication.    Diagnoses and all orders for this visit:    1. RAFAT (generalized anxiety disorder)  RAFAT 7 Score: 13. Increased situational stressors currently. Previously talked to therapist and has tried fluoxetine. Has discernment  with partner but no individual therapy. Place referral for counseling and initiated lexapro.  - escitalopram (LEXAPRO) 5 MG tablet; Take 1 tablet (5 mg) by mouth daily  Dispense: 90 tablet; Refill: 0  - Adult Mental Health  Referral; Future    2. Has daytime drowsiness  Wakes up not refreshed, significant snoring, and family history significant " "for sleep apnea. Sleep referral placed.   - Adult Sleep Eval & Management  Referral; Future    3. Acne, unspecified acne type  Acne on shoulders, upper chest/back most likely related to testosterone. Currently  using topical agents. Will add systemic therapy.  - doxycycline hyclate (VIBRAMYCIN) 50 MG capsule; Take 1 capsule (50 mg) by mouth daily  Dispense: 90 capsule; Refill: 3      Plan to follow up in 4-6 weeks for mood/med check.    Patient has been advised of split billing requirements and indicates understanding: Yes      COUNSELING:  Reviewed preventive health counseling, as reflected in patient instructions       Regular exercise       Healthy diet/nutrition      BMI:   Estimated body mass index is 27.96 kg/m  as calculated from the following:    Height as of this encounter: 1.651 m (5' 5\").    Weight as of this encounter: 76.2 kg (168 lb).         He reports that he has never smoked. He has never used smokeless tobacco.      FELY Barkley [facility]  Answers for HPI/ROS submitted by the patient on 7/11/2023  If you checked off any problems, how difficult have these problems made it for you to do your work, take care of things at home, or get along with other people?: Somewhat difficult  PHQ9 TOTAL SCORE: 14  Blood in stool: No  heartburn: No  peripheral edema: No  mood changes: Yes  Skin sensation changes: No  pelvic pain: No  vaginal bleeding: No  vaginal discharge: No  tenderness: No  breast mass: No  breast discharge: No  impotence: No  penile discharge: No      "

## 2023-07-13 ENCOUNTER — TELEPHONE (OUTPATIENT)
Dept: FAMILY MEDICINE | Facility: CLINIC | Age: 28
End: 2023-07-13
Payer: COMMERCIAL

## 2023-09-19 ASSESSMENT — PATIENT HEALTH QUESTIONNAIRE - PHQ9
SUM OF ALL RESPONSES TO PHQ QUESTIONS 1-9: 13
10. IF YOU CHECKED OFF ANY PROBLEMS, HOW DIFFICULT HAVE THESE PROBLEMS MADE IT FOR YOU TO DO YOUR WORK, TAKE CARE OF THINGS AT HOME, OR GET ALONG WITH OTHER PEOPLE: SOMEWHAT DIFFICULT
SUM OF ALL RESPONSES TO PHQ QUESTIONS 1-9: 13

## 2023-09-20 ENCOUNTER — OFFICE VISIT (OUTPATIENT)
Dept: FAMILY MEDICINE | Facility: CLINIC | Age: 28
End: 2023-09-20
Payer: COMMERCIAL

## 2023-09-20 VITALS
HEIGHT: 65 IN | DIASTOLIC BLOOD PRESSURE: 60 MMHG | OXYGEN SATURATION: 99 % | WEIGHT: 163 LBS | RESPIRATION RATE: 12 BRPM | BODY MASS INDEX: 27.16 KG/M2 | TEMPERATURE: 98.4 F | SYSTOLIC BLOOD PRESSURE: 110 MMHG | HEART RATE: 73 BPM

## 2023-09-20 DIAGNOSIS — L70.9 ACNE, UNSPECIFIED ACNE TYPE: ICD-10-CM

## 2023-09-20 DIAGNOSIS — F33.9 MAJOR DEPRESSIVE DISORDER, RECURRENT EPISODE WITH ANXIOUS DISTRESS (H): Primary | ICD-10-CM

## 2023-09-20 PROCEDURE — 90686 IIV4 VACC NO PRSV 0.5 ML IM: CPT

## 2023-09-20 PROCEDURE — 96127 BRIEF EMOTIONAL/BEHAV ASSMT: CPT

## 2023-09-20 PROCEDURE — 99213 OFFICE O/P EST LOW 20 MIN: CPT | Mod: 25

## 2023-09-20 PROCEDURE — 90471 IMMUNIZATION ADMIN: CPT

## 2023-09-20 RX ORDER — ESCITALOPRAM OXALATE 10 MG/1
10 TABLET ORAL DAILY
Qty: 90 TABLET | Refills: 3 | Status: SHIPPED | OUTPATIENT
Start: 2023-09-20

## 2023-09-20 RX ORDER — DOXYCYCLINE 100 MG/1
100 CAPSULE ORAL DAILY
Qty: 90 CAPSULE | Refills: 3 | Status: SHIPPED | OUTPATIENT
Start: 2023-09-20

## 2023-09-20 ASSESSMENT — ENCOUNTER SYMPTOMS: NERVOUS/ANXIOUS: 1

## 2023-09-20 NOTE — PROGRESS NOTES
Assessment & Plan     1. Major depressive disorder, recurrent episode with anxious distress (H)  PHQ 9 Score: 13. Increased situational stress. Has appointment with therapist tomorrow. Will increase lexapro to 10mg.   - escitalopram (LEXAPRO) 10 MG tablet; Take 1 tablet (10 mg) by mouth daily  Dispense: 90 tablet; Refill: 3    2. Acne, unspecified acne type  Improving but continues to have some cystic acne on his back and shoulders. Will increase doxy to 100mg daily.   - doxycycline monohydrate (MONODOX) 100 MG capsule; Take 1 capsule (100 mg) by mouth daily For acne  Dispense: 90 capsule; Refill: 3     Plan to follow up in 6 months for annual exam.     FELY Barkley CNP  [facility]    Subjective   Payam is a 28 year old, presenting for the following health issues:  Anxiety (2 months medication follow up; discuss possible dose increase /)      9/20/2023     9:33 AM   Additional Questions   Roomed by LIZ Slade       History of Present Illness       Reason for visit:  Anxiety, primary care, acne    He eats 0-1 servings of fruits and vegetables daily.He consumes 1 sweetened beverage(s) daily.He exercises with enough effort to increase his heart rate 60 or more minutes per day.  He exercises with enough effort to increase his heart rate 5 days per week. He is missing 1 dose(s) of medications per week.  He is not taking prescribed medications regularly due to remembering to take.       Payam presents today for follow up on depression and acne.    Lexapro initiated at last visit. Made him feel tired at first but is now feeling better. Is feeling more calm but is still feeling depressed almost daily. Some increased situational stress. He recently  from his partner of 10 years and she moved out of their shared apartment. Is having different friends stay overnight with him right now for support. He has established with a therapist and has an appointment tomorrow. No thoughts of self  "harm. He is also adopting a cat named Levi. Taking a road trip with friends to [place] to go  the cat.     Has home sleep study planned.         Review of Systems   Psychiatric/Behavioral:  The patient is nervous/anxious.       CONSTITUTIONAL: NEGATIVE for fever, chills, change in weight  RESP: NEGATIVE for significant cough or SOB  CV: NEGATIVE for chest pain, palpitations or peripheral edema  PSYCHIATRIC: anxiety, and stress      Objective    /60 (BP Location: Left arm, Patient Position: Sitting, Cuff Size: Adult Regular)   Pulse 73   Temp 98.4  F (36.9  C) (Tympanic)   Resp 12   Ht 1.651 m (5' 5\")   Wt 73.9 kg (163 lb)   LMP  (LMP Unknown)   SpO2 99%   BMI 27.12 kg/m    Body mass index is 27.12 kg/m .    Physical Exam   GENERAL: healthy, alert and no distress  RESP: lungs clear to auscultation - no rales, rhonchi or wheezes  CV: regular rate and rhythm, normal S1 S2, no S3 or S4, no murmur, click or rub, no peripheral edema and peripheral pulses strong  PSYCH: mentation appears normal, fatigued, and judgement and insight intact                  "

## 2024-02-24 ENCOUNTER — HEALTH MAINTENANCE LETTER (OUTPATIENT)
Age: 29
End: 2024-02-24

## 2025-03-09 ENCOUNTER — HEALTH MAINTENANCE LETTER (OUTPATIENT)
Age: 30
End: 2025-03-09

## (undated) DEVICE — SU ETHILON 3-0 PS-1 18" 1663H

## (undated) DEVICE — DRSG PRIMAPORE 02X3" 7133

## (undated) DEVICE — BRUSH SURGICAL SCRUB PLAIN STERILE 4454A

## (undated) DEVICE — NDL COUNTER 40CT  31142311

## (undated) DEVICE — DRAPE IOBAN INCISE 23X17" 6650EZ

## (undated) DEVICE — DRSG KERLIX FLUFFS X5

## (undated) DEVICE — DRAPE LAP W/ARMBOARD 29410

## (undated) DEVICE — PREP CHLORAPREP 26ML TINTED ORANGE  260815

## (undated) DEVICE — PACK MINOR CUSTOM ASC

## (undated) DEVICE — ESU ELEC BLADE 2.75" COATED/INSULATED E1455

## (undated) DEVICE — PEN MARKING SKIN W/PAPER RULER 31145785

## (undated) DEVICE — SU PROLENE 2-0 SHDA 36" 8523H

## (undated) DEVICE — ESU GROUND PAD ADULT W/CORD E7507

## (undated) DEVICE — DRSG XEROFORM 5X9" CUR253590W

## (undated) DEVICE — SU MONOCRYL 3-0 PS-1 27" Y936H

## (undated) DEVICE — SU PDS II 2-0 CT-1 27" Z339H

## (undated) DEVICE — DRAIN JACKSON PRATT RESERVOIR 100ML SU130-1305

## (undated) DEVICE — BNDG ELASTIC 6" DBL LENGTH UNSTERILE 6611-16

## (undated) DEVICE — ESU PENCIL SMOKE EVAC W/ROCKER SWITCH 0703-047-000

## (undated) DEVICE — SU VICRYL 2-0 CT-1 CR 8X18" UND J839D

## (undated) DEVICE — SOL WATER IRRIG 500ML BOTTLE 2F7113

## (undated) DEVICE — PAD ARMBOARD FOAM EGGCRATE COVIDEN 3114367

## (undated) DEVICE — SUCTION MANIFOLD NEPTUNE 2 SYS 1 PORT 702-025-000

## (undated) DEVICE — SU PLAIN 5-0 P-3 18" 686G

## (undated) DEVICE — DRAIN JACKSON PRATT ROUND W/TROCAR 15FR LF JP-HUR151

## (undated) DEVICE — LINEN TOWEL PACK X5 5464

## (undated) DEVICE — CLOSURE SYS SKIN PREMIERPRO EXOFINFUSION 4X60CM 3473

## (undated) DEVICE — SOL NACL 0.9% IRRIG 500ML BOTTLE 2F7123

## (undated) DEVICE — DRSG GAUZE 4X4" TRAY 6939

## (undated) DEVICE — PAD CHUX UNDERPAD 30X30"

## (undated) DEVICE — BLADE KNIFE SURG 10 371110

## (undated) DEVICE — DRSG KERLIX 4 1/2"X4YDS ROLL 6730

## (undated) DEVICE — SPONGE LAP 18X18" X8435

## (undated) DEVICE — SUCTION TIP YANKAUER W/O VENT K86

## (undated) DEVICE — GLOVE PROTEXIS MICRO 7.5  2D73PM75

## (undated) DEVICE — SU STRATAFIX MONOCRYL 3-0 SPIRAL PS-2 45CM SXMP1B107

## (undated) RX ORDER — DEXAMETHASONE SODIUM PHOSPHATE 4 MG/ML
INJECTION, SOLUTION INTRA-ARTICULAR; INTRALESIONAL; INTRAMUSCULAR; INTRAVENOUS; SOFT TISSUE
Status: DISPENSED
Start: 2022-04-19

## (undated) RX ORDER — PROPOFOL 10 MG/ML
INJECTION, EMULSION INTRAVENOUS
Status: DISPENSED
Start: 2022-04-19

## (undated) RX ORDER — PENICILLIN G POTASSIUM 5000000 [IU]/1
INJECTION, POWDER, FOR SOLUTION INTRAMUSCULAR; INTRAVENOUS
Status: DISPENSED
Start: 2022-04-18

## (undated) RX ORDER — HYDROMORPHONE HYDROCHLORIDE 1 MG/ML
INJECTION, SOLUTION INTRAMUSCULAR; INTRAVENOUS; SUBCUTANEOUS
Status: DISPENSED
Start: 2022-04-19

## (undated) RX ORDER — BUPIVACAINE HYDROCHLORIDE 2.5 MG/ML
INJECTION, SOLUTION EPIDURAL; INFILTRATION; INTRACAUDAL
Status: DISPENSED
Start: 2022-04-19

## (undated) RX ORDER — LIDOCAINE HYDROCHLORIDE 10 MG/ML
INJECTION, SOLUTION EPIDURAL; INFILTRATION; INTRACAUDAL; PERINEURAL
Status: DISPENSED
Start: 2022-08-10

## (undated) RX ORDER — CEFTRIAXONE SODIUM 250 MG/1
INJECTION, POWDER, FOR SOLUTION INTRAMUSCULAR; INTRAVENOUS
Status: DISPENSED
Start: 2022-04-18

## (undated) RX ORDER — GLYCOPYRROLATE 0.2 MG/ML
INJECTION INTRAMUSCULAR; INTRAVENOUS
Status: DISPENSED
Start: 2022-04-19

## (undated) RX ORDER — AMPICILLIN 250 MG/1
INJECTION, POWDER, FOR SOLUTION INTRAMUSCULAR; INTRAVENOUS
Status: DISPENSED
Start: 2022-04-18

## (undated) RX ORDER — LIDOCAINE HYDROCHLORIDE 20 MG/ML
INJECTION, SOLUTION EPIDURAL; INFILTRATION; INTRACAUDAL; PERINEURAL
Status: DISPENSED
Start: 2022-04-19

## (undated) RX ORDER — CEFAZOLIN SODIUM 1 G/3ML
INJECTION, POWDER, FOR SOLUTION INTRAMUSCULAR; INTRAVENOUS
Status: DISPENSED
Start: 2022-04-19

## (undated) RX ORDER — TRIAMCINOLONE ACETONIDE 40 MG/ML
INJECTION, SUSPENSION INTRA-ARTICULAR; INTRAMUSCULAR
Status: DISPENSED
Start: 2022-08-10

## (undated) RX ORDER — OXYCODONE HYDROCHLORIDE 5 MG/1
TABLET ORAL
Status: DISPENSED
Start: 2022-04-19

## (undated) RX ORDER — ACETAMINOPHEN 325 MG/1
TABLET ORAL
Status: DISPENSED
Start: 2022-04-19

## (undated) RX ORDER — FENTANYL CITRATE 50 UG/ML
INJECTION, SOLUTION INTRAMUSCULAR; INTRAVENOUS
Status: DISPENSED
Start: 2022-04-19

## (undated) RX ORDER — ONDANSETRON 2 MG/ML
INJECTION INTRAMUSCULAR; INTRAVENOUS
Status: DISPENSED
Start: 2022-04-19